# Patient Record
Sex: MALE | Race: WHITE | NOT HISPANIC OR LATINO | URBAN - METROPOLITAN AREA
[De-identification: names, ages, dates, MRNs, and addresses within clinical notes are randomized per-mention and may not be internally consistent; named-entity substitution may affect disease eponyms.]

---

## 2024-07-19 ENCOUNTER — LAB VISIT (OUTPATIENT)
Dept: LAB | Facility: HOSPITAL | Age: 54
End: 2024-07-19
Attending: INTERNAL MEDICINE
Payer: COMMERCIAL

## 2024-07-19 ENCOUNTER — OFFICE VISIT (OUTPATIENT)
Dept: FAMILY MEDICINE | Facility: CLINIC | Age: 54
End: 2024-07-19
Payer: COMMERCIAL

## 2024-07-19 VITALS
HEART RATE: 78 BPM | SYSTOLIC BLOOD PRESSURE: 120 MMHG | BODY MASS INDEX: 29.83 KG/M2 | DIASTOLIC BLOOD PRESSURE: 80 MMHG | OXYGEN SATURATION: 97 % | HEIGHT: 72 IN | TEMPERATURE: 98 F | WEIGHT: 220.25 LBS

## 2024-07-19 DIAGNOSIS — Z11.59 NEED FOR HEPATITIS C SCREENING TEST: ICD-10-CM

## 2024-07-19 DIAGNOSIS — Z12.12 ENCOUNTER FOR COLORECTAL CANCER SCREENING: ICD-10-CM

## 2024-07-19 DIAGNOSIS — Z12.11 ENCOUNTER FOR COLORECTAL CANCER SCREENING: ICD-10-CM

## 2024-07-19 DIAGNOSIS — Z11.4 ENCOUNTER FOR SCREENING FOR HIV: ICD-10-CM

## 2024-07-19 DIAGNOSIS — Z79.4 TYPE 2 DIABETES MELLITUS WITH LEFT EYE AFFECTED BY RETINOPATHY WITHOUT MACULAR EDEMA, WITH LONG-TERM CURRENT USE OF INSULIN, UNSPECIFIED RETINOPATHY SEVERITY: ICD-10-CM

## 2024-07-19 DIAGNOSIS — E11.319 TYPE 2 DIABETES MELLITUS WITH LEFT EYE AFFECTED BY RETINOPATHY WITHOUT MACULAR EDEMA, WITH LONG-TERM CURRENT USE OF INSULIN, UNSPECIFIED RETINOPATHY SEVERITY: ICD-10-CM

## 2024-07-19 DIAGNOSIS — F43.10 PTSD (POST-TRAUMATIC STRESS DISORDER): ICD-10-CM

## 2024-07-19 DIAGNOSIS — Z12.5 SCREENING PSA (PROSTATE SPECIFIC ANTIGEN): ICD-10-CM

## 2024-07-19 DIAGNOSIS — Z00.00 ANNUAL PHYSICAL EXAM: Primary | ICD-10-CM

## 2024-07-19 DIAGNOSIS — N53.19 EJACULATORY DISORDER: ICD-10-CM

## 2024-07-19 DIAGNOSIS — Z00.00 ANNUAL PHYSICAL EXAM: ICD-10-CM

## 2024-07-19 LAB
ALBUMIN SERPL BCP-MCNC: 3.5 G/DL (ref 3.5–5.2)
ALP SERPL-CCNC: 80 U/L (ref 55–135)
ALT SERPL W/O P-5'-P-CCNC: 16 U/L (ref 10–44)
ANION GAP SERPL CALC-SCNC: 7 MMOL/L (ref 8–16)
AST SERPL-CCNC: 15 U/L (ref 10–40)
BASOPHILS # BLD AUTO: 0.06 K/UL (ref 0–0.2)
BASOPHILS NFR BLD: 1.1 % (ref 0–1.9)
BILIRUB SERPL-MCNC: 0.9 MG/DL (ref 0.1–1)
BUN SERPL-MCNC: 12 MG/DL (ref 6–20)
CALCIUM SERPL-MCNC: 9.5 MG/DL (ref 8.7–10.5)
CHLORIDE SERPL-SCNC: 109 MMOL/L (ref 95–110)
CHOLEST SERPL-MCNC: 182 MG/DL (ref 120–199)
CHOLEST/HDLC SERPL: 5.7 {RATIO} (ref 2–5)
CO2 SERPL-SCNC: 28 MMOL/L (ref 23–29)
COMPLEXED PSA SERPL-MCNC: 0.9 NG/ML (ref 0–4)
CREAT SERPL-MCNC: 1 MG/DL (ref 0.5–1.4)
DIFFERENTIAL METHOD BLD: NORMAL
EOSINOPHIL # BLD AUTO: 0.2 K/UL (ref 0–0.5)
EOSINOPHIL NFR BLD: 3.3 % (ref 0–8)
ERYTHROCYTE [DISTWIDTH] IN BLOOD BY AUTOMATED COUNT: 13.1 % (ref 11.5–14.5)
EST. GFR  (NO RACE VARIABLE): >60 ML/MIN/1.73 M^2
ESTIMATED AVG GLUCOSE: 220 MG/DL (ref 68–131)
GLUCOSE SERPL-MCNC: 124 MG/DL (ref 70–110)
HBA1C MFR BLD: 9.3 % (ref 4–5.6)
HCT VFR BLD AUTO: 41.1 % (ref 40–54)
HCV AB SERPL QL IA: NORMAL
HDLC SERPL-MCNC: 32 MG/DL (ref 40–75)
HDLC SERPL: 17.6 % (ref 20–50)
HGB BLD-MCNC: 14.6 G/DL (ref 14–18)
HIV 1+2 AB+HIV1 P24 AG SERPL QL IA: NORMAL
IMM GRANULOCYTES # BLD AUTO: 0.01 K/UL (ref 0–0.04)
IMM GRANULOCYTES NFR BLD AUTO: 0.2 % (ref 0–0.5)
LDLC SERPL CALC-MCNC: 107.8 MG/DL (ref 63–159)
LYMPHOCYTES # BLD AUTO: 1.6 K/UL (ref 1–4.8)
LYMPHOCYTES NFR BLD: 29.7 % (ref 18–48)
MCH RBC QN AUTO: 30.5 PG (ref 27–31)
MCHC RBC AUTO-ENTMCNC: 35.5 G/DL (ref 32–36)
MCV RBC AUTO: 86 FL (ref 82–98)
MONOCYTES # BLD AUTO: 0.4 K/UL (ref 0.3–1)
MONOCYTES NFR BLD: 6.9 % (ref 4–15)
NEUTROPHILS # BLD AUTO: 3.2 K/UL (ref 1.8–7.7)
NEUTROPHILS NFR BLD: 58.8 % (ref 38–73)
NONHDLC SERPL-MCNC: 150 MG/DL
NRBC BLD-RTO: 0 /100 WBC
PLATELET # BLD AUTO: 178 K/UL (ref 150–450)
PMV BLD AUTO: 10.3 FL (ref 9.2–12.9)
POTASSIUM SERPL-SCNC: 4.2 MMOL/L (ref 3.5–5.1)
PROT SERPL-MCNC: 6.6 G/DL (ref 6–8.4)
RBC # BLD AUTO: 4.78 M/UL (ref 4.6–6.2)
SODIUM SERPL-SCNC: 144 MMOL/L (ref 136–145)
TRIGL SERPL-MCNC: 211 MG/DL (ref 30–150)
TSH SERPL DL<=0.005 MIU/L-ACNC: 1.36 UIU/ML (ref 0.4–4)
WBC # BLD AUTO: 5.39 K/UL (ref 3.9–12.7)

## 2024-07-19 PROCEDURE — 83036 HEMOGLOBIN GLYCOSYLATED A1C: CPT | Performed by: INTERNAL MEDICINE

## 2024-07-19 PROCEDURE — 80053 COMPREHEN METABOLIC PANEL: CPT | Performed by: INTERNAL MEDICINE

## 2024-07-19 PROCEDURE — 3074F SYST BP LT 130 MM HG: CPT | Mod: CPTII,S$GLB,, | Performed by: INTERNAL MEDICINE

## 2024-07-19 PROCEDURE — 84443 ASSAY THYROID STIM HORMONE: CPT | Performed by: INTERNAL MEDICINE

## 2024-07-19 PROCEDURE — 86803 HEPATITIS C AB TEST: CPT | Performed by: INTERNAL MEDICINE

## 2024-07-19 PROCEDURE — 80061 LIPID PANEL: CPT | Performed by: INTERNAL MEDICINE

## 2024-07-19 PROCEDURE — 85025 COMPLETE CBC W/AUTO DIFF WBC: CPT | Performed by: INTERNAL MEDICINE

## 2024-07-19 PROCEDURE — 99999 PR PBB SHADOW E&M-NEW PATIENT-LVL V: CPT | Mod: PBBFAC,,, | Performed by: INTERNAL MEDICINE

## 2024-07-19 PROCEDURE — 1159F MED LIST DOCD IN RCRD: CPT | Mod: CPTII,S$GLB,, | Performed by: INTERNAL MEDICINE

## 2024-07-19 PROCEDURE — 99386 PREV VISIT NEW AGE 40-64: CPT | Mod: S$GLB,,, | Performed by: INTERNAL MEDICINE

## 2024-07-19 PROCEDURE — 36415 COLL VENOUS BLD VENIPUNCTURE: CPT | Performed by: INTERNAL MEDICINE

## 2024-07-19 PROCEDURE — 84153 ASSAY OF PSA TOTAL: CPT | Performed by: INTERNAL MEDICINE

## 2024-07-19 PROCEDURE — 87389 HIV-1 AG W/HIV-1&-2 AB AG IA: CPT | Performed by: INTERNAL MEDICINE

## 2024-07-19 PROCEDURE — 3079F DIAST BP 80-89 MM HG: CPT | Mod: CPTII,S$GLB,, | Performed by: INTERNAL MEDICINE

## 2024-07-19 PROCEDURE — 3008F BODY MASS INDEX DOCD: CPT | Mod: CPTII,S$GLB,, | Performed by: INTERNAL MEDICINE

## 2024-07-19 PROCEDURE — 1160F RVW MEDS BY RX/DR IN RCRD: CPT | Mod: CPTII,S$GLB,, | Performed by: INTERNAL MEDICINE

## 2024-07-19 RX ORDER — INSULIN GLARGINE 100 [IU]/ML
40 INJECTION, SOLUTION SUBCUTANEOUS
COMMUNITY

## 2024-07-19 NOTE — PROGRESS NOTES
SUBJECTIVE     Chief Complaint   Patient presents with    Establish Care       HPI  Ivan Ngo is a 53 y.o. male with multiple medical diagnoses as listed in the medical history and problem list that presents for annual exam. Pt has been doing well since his last visit. He has a decreased appetite and eats well. He does exercise by walking. He sleeps for ~2-6 hours nightly. Pt does not take OTC supplements. He does have any current stressors and does not have an outlet. Pt is not UTD on age appropriate CA screening.    PAST MEDICAL HISTORY:  Past Medical History:   Diagnosis Date    PTSD (post-traumatic stress disorder)     Type 2 diabetes mellitus with unspecified diabetic retinopathy without macular edema        PAST SURGICAL HISTORY:  Past Surgical History:   Procedure Laterality Date    EYE SURGERY  2023    Detachedretina       SOCIAL HISTORY:  Social History     Socioeconomic History    Marital status:    Tobacco Use    Smoking status: Never    Smokeless tobacco: Never   Substance and Sexual Activity    Alcohol use: Never    Drug use: Never    Sexual activity: Yes     Partners: Female     Birth control/protection: I.U.D.     Social Determinants of Health     Financial Resource Strain: Low Risk  (7/18/2024)    Overall Financial Resource Strain (CARDIA)     Difficulty of Paying Living Expenses: Not hard at all   Food Insecurity: No Food Insecurity (7/18/2024)    Hunger Vital Sign     Worried About Running Out of Food in the Last Year: Never true     Ran Out of Food in the Last Year: Never true   Physical Activity: Insufficiently Active (7/18/2024)    Exercise Vital Sign     Days of Exercise per Week: 1 day     Minutes of Exercise per Session: 10 min   Stress: Stress Concern Present (7/18/2024)    [employer] [place] [employer] - Occupational Stress Questionnaire     Feeling of Stress : To some extent   Housing Stability: High Risk (7/18/2024)    Housing Stability Vital Sign     Unable to Pay  for Housing in the Last Year: Yes       FAMILY HISTORY:  Family History   Problem Relation Name Age of Onset    Breast cancer Mother Alexandrea 42    Early death Mother Alexandrea     Alcohol abuse Father Jose Luis     Cancer Father Jose Luis 55        Lungs, thyroid, brain    Diabetes Father Jose Luis     Early death Father Jose Luis        ALLERGIES AND MEDICATIONS: updated and reviewed.  Review of patient's allergies indicates:   Allergen Reactions    Allergen ext-venom-honey bee      Other Reaction(s): Pharyngeal swelling, Bronchospasm, Pharyngeal swelling, Bronchospasm    Wasp venom      Other Reaction(s): Pharyngeal swelling, Bronchospasm, Pharyngeal swelling, Bronchospasm     Current Outpatient Medications   Medication Sig Dispense Refill    insulin glargine U-100, Lantus, 100 unit/mL injection Inject 40 Units into the skin.       No current facility-administered medications for this visit.       ROS  Review of Systems   Constitutional:  Negative for chills and fever.   HENT:  Negative for hearing loss and sore throat.    Eyes:  Positive for visual disturbance.   Respiratory:  Negative for cough and shortness of breath.    Cardiovascular:  Negative for chest pain, palpitations and leg swelling.   Gastrointestinal:  Negative for abdominal pain, constipation, diarrhea, nausea and vomiting.   Genitourinary:  Positive for frequency. Negative for dysuria and urgency.   Musculoskeletal:  Negative for arthralgias, joint swelling and myalgias.   Skin:  Negative for rash and wound.   Neurological:  Negative for headaches.   Psychiatric/Behavioral:  Negative for agitation and confusion. The patient is nervous/anxious.          OBJECTIVE     Physical Exam  Vitals:    07/19/24 1011   BP: 120/80   Pulse: 78   Temp: 98.4 °F (36.9 °C)    Body mass index is 29.87 kg/m².  Weight: 99.9 kg (220 lb 3.8 oz)   Height: 6' (182.9 cm)     Physical Exam  Constitutional:       General: He is not in acute distress.     Appearance: He is well-developed.   HENT:       Head: Normocephalic and atraumatic.      Right Ear: Tympanic membrane, ear canal and external ear normal.      Left Ear: Tympanic membrane, ear canal and external ear normal.      Nose: Nose normal.   Eyes:      General: No scleral icterus.        Right eye: No discharge.         Left eye: No discharge.      Conjunctiva/sclera: Conjunctivae normal.   Neck:      Vascular: No JVD.      Trachea: No tracheal deviation.   Cardiovascular:      Rate and Rhythm: Normal rate and regular rhythm.      Heart sounds: Normal heart sounds. No murmur heard.     No friction rub. No gallop.   Pulmonary:      Effort: Pulmonary effort is normal. No respiratory distress.      Breath sounds: Normal breath sounds. No wheezing.   Abdominal:      General: Bowel sounds are normal. There is no distension.      Palpations: Abdomen is soft. There is no mass.      Tenderness: There is no abdominal tenderness. There is no guarding or rebound.   Musculoskeletal:         General: No tenderness or deformity. Normal range of motion.      Cervical back: Normal range of motion and neck supple.   Skin:     General: Skin is warm and dry.      Findings: No erythema or rash.   Neurological:      Mental Status: He is alert and oriented to person, place, and time.      Motor: No abnormal muscle tone.      Coordination: Coordination normal.   Psychiatric:         Behavior: Behavior normal.         Thought Content: Thought content normal.         Judgment: Judgment normal.           Health Maintenance         Date Due Completion Date    Hepatitis C Screening Never done ---    HIV Screening Never done ---    Colorectal Cancer Screening Never done ---    Shingles Vaccine (1 of 2) Never done ---    COVID-19 Vaccine (1 - 2023-24 season) Never done ---    Influenza Vaccine (1) 09/01/2024 10/23/2019    TETANUS VACCINE 01/01/2027 1/1/2017    Lipid Panel 07/19/2029 7/19/2024              ASSESSMENT     53 y.o. male with     1. Annual physical exam    2. Type 2  diabetes mellitus with left eye affected by retinopathy without macular edema, with long-term current use of insulin, unspecified retinopathy severity    3. PTSD (post-traumatic stress disorder)    4. Ejaculatory disorder    5. Encounter for screening for HIV    6. Need for hepatitis C screening test    7. Encounter for colorectal cancer screening    8. Screening PSA (prostate specific antigen)        PLAN:     1. Annual physical exam  - Counseled on age appropriate medical preventative services, including age appropriate cancer screenings, over all nutritional health, need for a consistent exercise regimen and an over all push towards maintaining a vigorous and active lifestyle.  Counseled on age appropriate vaccines and discussed upcoming health care needs based on age/gender.  Spent time with patient counseling on need for a good patient/doctor relationship moving forward.  Discussed use of common OTC medications and supplements.  Discussed common dietary aids and use of caffeine and the need for good sleep hygiene and stress management.  - CBC Auto Differential; Future  - Comprehensive Metabolic Panel; Future  - Hemoglobin A1C; Future  - TSH; Future  - Lipid Panel; Future    2. Type 2 diabetes mellitus with left eye affected by retinopathy without macular edema, with long-term current use of insulin, unspecified retinopathy severity  - Check lans  - Hemoglobin A1C; Future  - Microalbumin/Creatinine Ratio, Urine; Future    3. PTSD (post-traumatic stress disorder)  - Stable; no acute issues    4. Ejaculatory disorder  - Ambulatory referral/consult to Urology; Future    5. Encounter for screening for HIV  - HIV 1/2 Ag/Ab (4th Gen); Future    6. Need for hepatitis C screening test  - Hepatitis C Antibody; Future    7. Encounter for colorectal cancer screening  - Ambulatory referral/consult to Endo Procedure ; Future    8. Screening PSA (prostate specific antigen)  - PSA, Screening; Future        RTC in 3-6  months based on HgbA1C     Brunilda Osborn MD  07/19/2024 10:28 AM        No follow-ups on file.

## 2024-07-22 DIAGNOSIS — E11.319 TYPE 2 DIABETES MELLITUS WITH LEFT EYE AFFECTED BY RETINOPATHY WITHOUT MACULAR EDEMA, WITHOUT LONG-TERM CURRENT USE OF INSULIN, UNSPECIFIED RETINOPATHY SEVERITY: Primary | ICD-10-CM

## 2024-07-22 DIAGNOSIS — E78.5 HYPERLIPIDEMIA ASSOCIATED WITH TYPE 2 DIABETES MELLITUS: ICD-10-CM

## 2024-07-22 DIAGNOSIS — E11.69 HYPERLIPIDEMIA ASSOCIATED WITH TYPE 2 DIABETES MELLITUS: ICD-10-CM

## 2024-07-22 RX ORDER — ATORVASTATIN CALCIUM 40 MG/1
40 TABLET, FILM COATED ORAL DAILY
Qty: 90 TABLET | Refills: 3 | Status: SHIPPED | OUTPATIENT
Start: 2024-07-22 | End: 2025-07-22

## 2024-07-22 RX ORDER — TIRZEPATIDE 2.5 MG/.5ML
2.5 INJECTION, SOLUTION SUBCUTANEOUS
Qty: 4 PEN | Refills: 0 | Status: SHIPPED | OUTPATIENT
Start: 2024-07-22

## 2024-07-25 ENCOUNTER — PATIENT MESSAGE (OUTPATIENT)
Dept: FAMILY MEDICINE | Facility: CLINIC | Age: 54
End: 2024-07-25
Payer: COMMERCIAL

## 2024-07-25 RX ORDER — INSULIN GLARGINE 100 [IU]/ML
40 INJECTION, SOLUTION SUBCUTANEOUS DAILY
Qty: 30 ML | Refills: 0 | Status: SHIPPED | OUTPATIENT
Start: 2024-07-25

## 2024-07-25 NOTE — TELEPHONE ENCOUNTER
No care due was identified.  Health [facility] Embedded Care Due Messages. Reference number: [id number].   7/25/2024 8:17:45 AM CDT

## 2024-08-01 ENCOUNTER — OFFICE VISIT (OUTPATIENT)
Dept: UROLOGY | Facility: CLINIC | Age: 54
End: 2024-08-01
Payer: COMMERCIAL

## 2024-08-01 ENCOUNTER — LAB VISIT (OUTPATIENT)
Dept: LAB | Facility: HOSPITAL | Age: 54
End: 2024-08-01
Attending: STUDENT IN AN ORGANIZED HEALTH CARE EDUCATION/TRAINING PROGRAM
Payer: COMMERCIAL

## 2024-08-01 VITALS — HEIGHT: 72 IN | WEIGHT: 225.88 LBS | BODY MASS INDEX: 30.6 KG/M2

## 2024-08-01 DIAGNOSIS — E78.5 HYPERLIPIDEMIA ASSOCIATED WITH TYPE 2 DIABETES MELLITUS: ICD-10-CM

## 2024-08-01 DIAGNOSIS — N53.19 EJACULATORY DISORDER: Primary | ICD-10-CM

## 2024-08-01 DIAGNOSIS — N53.19 EJACULATORY DISORDER: ICD-10-CM

## 2024-08-01 DIAGNOSIS — N52.9 ERECTILE DYSFUNCTION, UNSPECIFIED ERECTILE DYSFUNCTION TYPE: ICD-10-CM

## 2024-08-01 DIAGNOSIS — E11.69 HYPERLIPIDEMIA ASSOCIATED WITH TYPE 2 DIABETES MELLITUS: ICD-10-CM

## 2024-08-01 LAB — COMPLEXED PSA SERPL-MCNC: 0.8 NG/ML (ref 0–4)

## 2024-08-01 PROCEDURE — 99999 PR PBB SHADOW E&M-EST. PATIENT-LVL III: CPT | Mod: PBBFAC,,, | Performed by: STUDENT IN AN ORGANIZED HEALTH CARE EDUCATION/TRAINING PROGRAM

## 2024-08-01 PROCEDURE — 84153 ASSAY OF PSA TOTAL: CPT | Performed by: STUDENT IN AN ORGANIZED HEALTH CARE EDUCATION/TRAINING PROGRAM

## 2024-08-01 PROCEDURE — 36415 COLL VENOUS BLD VENIPUNCTURE: CPT | Performed by: STUDENT IN AN ORGANIZED HEALTH CARE EDUCATION/TRAINING PROGRAM

## 2024-08-01 RX ORDER — TADALAFIL 10 MG/1
10 TABLET ORAL DAILY PRN
Qty: 60 TABLET | Refills: 3 | Status: SHIPPED | OUTPATIENT
Start: 2024-08-01 | End: 2025-08-01

## 2024-08-01 NOTE — PROGRESS NOTES
Patient ID: Ivan Ngo is a 53 y.o. male.    Chief Complaint: ED/ ejaculation concern    Referral: Brunilda Osborn MD  [address]  [place] [id number]     [facility]  53 y.o. who presents to the Urology clinic for evaluation of ED and dry ejaculation. Patient accompanied by wife who provides additional hx. Patient notes difficulty obtaining and maintaining erections. Patient w/ hx of uncontrolled DM, just started on mounjaro with hopes of improved control. Patient notes FH of breast cancer. No FH of prostate cancer. Patient w/ concerns about emptying his bladder, denies straining, or UTIs.       Medically Necessary ROS documented in HPI    Past Medical History  Active Ambulatory Problems     Diagnosis Date Noted    Type 2 diabetes mellitus with unspecified diabetic retinopathy without macular edema     PTSD (post-traumatic stress disorder)     Hyperlipidemia associated with type 2 diabetes mellitus 07/22/2024     Resolved Ambulatory Problems     Diagnosis Date Noted    No Resolved Ambulatory Problems     No Additional Past Medical History         Past Surgical History  Past Surgical History:   Procedure Laterality Date    EYE SURGERY  2023    Detachedretina       Social History       Medications    Current Outpatient Medications:     atorvastatin (LIPITOR) 40 MG tablet, Take 1 tablet (40 mg total) by mouth once daily., Disp: 90 tablet, Rfl: 3    insulin glargine U-100, Lantus, 100 unit/mL injection, Inject 40 Units into the skin once daily., Disp: 30 mL, Rfl: 0    tirzepatide (MOUNJARO) 2.5 mg/0.5 mL PnIj, Inject 2.5 mg into the skin every 7 days., Disp: 4 Pen, Rfl: 0    Allergies  Review of patient's allergies indicates:   Allergen Reactions    Allergen ext-venom-honey bee      Other Reaction(s): Pharyngeal swelling, Bronchospasm, Pharyngeal swelling, Bronchospasm    Wasp venom      Other Reaction(s): Pharyngeal swelling, Bronchospasm, Pharyngeal swelling, Bronchospasm       Patient's PMH, FH,  Social hx, Medications, allergies reviewed and updated as pertinent to today's visit    Objective:      Physical Exam  Constitutional:       General: He is not in acute distress.     Appearance: He is well-developed. He is not ill-appearing, toxic-appearing or diaphoretic.   HENT:      Head: Normocephalic and atraumatic.      Mouth/Throat:      Mouth: Mucous membranes are moist.   Eyes:      Conjunctiva/sclera: Conjunctivae normal.   Pulmonary:      Effort: Pulmonary effort is normal. No respiratory distress.   Abdominal:      General: There is no distension.      Palpations: Abdomen is soft. There is no mass.      Tenderness: There is no right CVA tenderness or left CVA tenderness.   Musculoskeletal:         General: No swelling or deformity.      Cervical back: Neck supple.   Skin:     Findings: No rash.   Neurological:      Mental Status: He is alert and oriented to person, place, and time.      Gait: Gait normal.   Psychiatric:         Mood and Affect: Mood normal.         Thought Content: Thought content normal.         Judgment: Judgment normal.         Assessment:       1. Ejaculatory disorder    2. Hyperlipidemia associated with type 2 diabetes mellitus    3. Erectile dysfunction, unspecified erectile dysfunction type        Plan:       Retrograde ejaculation  Discussed ejaculatory dysfunction is often seen in patients with uncontrolled DM  Provided patient is not trying to conceive no further management is recommended  Encouraged patient to follow up with PCP for strict glucose control    ED  Discussed and recommended tadalafil 10mg to start  Can consider increase to 20mg if ineffective    Voiding dysfunction   cc, acceptable  Discussed prolonged uncontrolled DM can negatively impact bladder function  Will continue to monitor  PSA due to FH of breast cancer, which increases risk of prostate cancer    RTC 3 months or sooner for sx check

## 2024-08-02 ENCOUNTER — PATIENT OUTREACH (OUTPATIENT)
Dept: ADMINISTRATIVE | Facility: HOSPITAL | Age: 54
End: 2024-08-02
Payer: COMMERCIAL

## 2024-08-02 NOTE — PROGRESS NOTES
Health Maintenance Due   Topic Date Due    Foot Exam  Never done    Eye Exam  Never done    Pneumococcal Vaccines (Age 0-64) (2 of 2 - PCV) 03/15/2017    Colorectal Cancer Screening  Never done    Shingles Vaccine (1 of 2) Never done    COVID-19 Vaccine (1 - 2023-24 season) Never done     Chart review done. HM updated. Immunizations reviewed & updated. Care Everywhere updated.  PORTAL MESSAGE SENT TO PATIENT ABOUT OVERDUE HM  PAT APPOINTMENT SCHEDULED

## 2024-08-20 DIAGNOSIS — E11.319 TYPE 2 DIABETES MELLITUS WITH LEFT EYE AFFECTED BY RETINOPATHY WITHOUT MACULAR EDEMA, WITHOUT LONG-TERM CURRENT USE OF INSULIN, UNSPECIFIED RETINOPATHY SEVERITY: ICD-10-CM

## 2024-08-20 RX ORDER — TIRZEPATIDE 2.5 MG/.5ML
INJECTION, SOLUTION SUBCUTANEOUS
OUTPATIENT
Start: 2024-08-20

## 2024-08-20 RX ORDER — TIRZEPATIDE 5 MG/.5ML
5 INJECTION, SOLUTION SUBCUTANEOUS
Qty: 4 PEN | Refills: 0 | Status: SHIPPED | OUTPATIENT
Start: 2024-08-20

## 2024-08-20 NOTE — TELEPHONE ENCOUNTER
Refill Routing Note   Medication(s) are not appropriate for processing by [facility] for the following reason(s):        New or recently adjusted medication    [facility] action(s):  Defer               Appointments  past 12m or future 3m with PCP    Date Provider   Last Visit   7/19/2024 Brunilda Osborn MD   Next Visit   9/30/2024 Brunilda Osborn MD   ED visits in past 90 days: 0        Note composed:4:10 PM 08/20/2024

## 2024-08-20 NOTE — TELEPHONE ENCOUNTER
No care due was identified.  Health [facility] Embedded Care Due Messages. Reference number: [id number].   8/20/2024 12:06:25 AM CDT

## 2024-09-10 RX ORDER — INSULIN GLARGINE 100 [IU]/ML
40 INJECTION, SOLUTION SUBCUTANEOUS DAILY
Qty: 30 ML | Refills: 0 | Status: SHIPPED | OUTPATIENT
Start: 2024-09-10

## 2024-09-30 ENCOUNTER — PATIENT MESSAGE (OUTPATIENT)
Dept: FAMILY MEDICINE | Facility: CLINIC | Age: 54
End: 2024-09-30

## 2024-09-30 ENCOUNTER — OFFICE VISIT (OUTPATIENT)
Dept: FAMILY MEDICINE | Facility: CLINIC | Age: 54
End: 2024-09-30
Payer: COMMERCIAL

## 2024-09-30 VITALS
WEIGHT: 235.69 LBS | HEIGHT: 72 IN | DIASTOLIC BLOOD PRESSURE: 84 MMHG | OXYGEN SATURATION: 97 % | BODY MASS INDEX: 31.92 KG/M2 | HEART RATE: 85 BPM | SYSTOLIC BLOOD PRESSURE: 132 MMHG | TEMPERATURE: 98 F

## 2024-09-30 DIAGNOSIS — E11.319 TYPE 2 DIABETES MELLITUS WITH LEFT EYE AFFECTED BY RETINOPATHY WITHOUT MACULAR EDEMA, WITH LONG-TERM CURRENT USE OF INSULIN, UNSPECIFIED RETINOPATHY SEVERITY: ICD-10-CM

## 2024-09-30 DIAGNOSIS — E11.319 TYPE 2 DIABETES MELLITUS WITH LEFT EYE AFFECTED BY RETINOPATHY WITHOUT MACULAR EDEMA, WITH LONG-TERM CURRENT USE OF INSULIN, UNSPECIFIED RETINOPATHY SEVERITY: Primary | ICD-10-CM

## 2024-09-30 DIAGNOSIS — Z79.4 TYPE 2 DIABETES MELLITUS WITH LEFT EYE AFFECTED BY RETINOPATHY WITHOUT MACULAR EDEMA, WITH LONG-TERM CURRENT USE OF INSULIN, UNSPECIFIED RETINOPATHY SEVERITY: ICD-10-CM

## 2024-09-30 DIAGNOSIS — Z79.4 TYPE 2 DIABETES MELLITUS WITH LEFT EYE AFFECTED BY RETINOPATHY WITHOUT MACULAR EDEMA, WITH LONG-TERM CURRENT USE OF INSULIN, UNSPECIFIED RETINOPATHY SEVERITY: Primary | ICD-10-CM

## 2024-09-30 PROCEDURE — 99999 PR PBB SHADOW E&M-EST. PATIENT-LVL III: CPT | Mod: PBBFAC,,, | Performed by: INTERNAL MEDICINE

## 2024-09-30 RX ORDER — BLOOD-GLUCOSE,RECEIVER,CONT
1 EACH MISCELLANEOUS DAILY
Qty: 1 EACH | Refills: 5 | Status: SHIPPED | OUTPATIENT
Start: 2024-09-30 | End: 2025-09-30

## 2024-09-30 RX ORDER — INSULIN GLARGINE 100 [IU]/ML
50 INJECTION, SOLUTION SUBCUTANEOUS DAILY
Qty: 30 ML | Refills: 0 | Status: SHIPPED | OUTPATIENT
Start: 2024-09-30

## 2024-09-30 RX ORDER — BLOOD-GLUCOSE TRANSMITTER
1 EACH MISCELLANEOUS DAILY
Qty: 1 EACH | Refills: 5 | Status: SHIPPED | OUTPATIENT
Start: 2024-09-30 | End: 2025-09-30

## 2024-09-30 RX ORDER — INSULIN GLARGINE 100 [IU]/ML
INJECTION, SOLUTION SUBCUTANEOUS
COMMUNITY
End: 2024-09-30

## 2024-09-30 NOTE — TELEPHONE ENCOUNTER
No care due was identified.  Health [facility] Embedded Care Due Messages. Reference number: [id number].   9/30/2024 4:54:32 PM CDT

## 2024-09-30 NOTE — PROGRESS NOTES
SUBJECTIVE     Chief Complaint   Patient presents with    Diabetes       HPI  Ivan Ngo is a 53 y.o. male with multiple medical diagnoses as listed in the medical history and problem list that presents for evaluation for DM2. Pt has been doing  well  since last visit. he is fully compliant with meds and denies any adverse side effects. Pt is  compliant  with an ADA diet and does not exercise. Pt does check his blood sugar levels at home with fasting readings ranging from  130  and random blood glucose levels ranging from  200s-300 . Pt reports 2-3 hypoglycemic episodes since last visit. Pt presents for med refills today and is without any other complaints.     PAST MEDICAL HISTORY:  Past Medical History:   Diagnosis Date    PTSD (post-traumatic stress disorder)     Type 2 diabetes mellitus with unspecified diabetic retinopathy without macular edema        PAST SURGICAL HISTORY:  Past Surgical History:   Procedure Laterality Date    EYE SURGERY Bilateral 2023    Detachedretina       SOCIAL HISTORY:  Social History     Socioeconomic History    Marital status:    Tobacco Use    Smoking status: Never    Smokeless tobacco: Never   Substance and Sexual Activity    Alcohol use: Never    Drug use: Never    Sexual activity: Yes     Partners: Female     Birth control/protection: I.U.D.     Social Drivers of Health     Financial Resource Strain: Low Risk  (7/18/2024)    Overall Financial Resource Strain (CARDIA)     Difficulty of Paying Living Expenses: Not hard at all   Food Insecurity: No Food Insecurity (7/18/2024)    Hunger Vital Sign     Worried About Running Out of Food in the Last Year: Never true     Ran Out of Food in the Last Year: Never true   Physical Activity: Insufficiently Active (7/18/2024)    Exercise Vital Sign     Days of Exercise per Week: 1 day     Minutes of Exercise per Session: 10 min   Stress: Stress Concern Present (7/18/2024)    [employer] [place] [employer] - Occupational Stress  Questionnaire     Feeling of Stress : To some extent   Housing Stability: High Risk (7/18/2024)    Housing Stability Vital Sign     Unable to Pay for Housing in the Last Year: Yes       FAMILY HISTORY:  Family History   Problem Relation Name Age of Onset    Breast cancer Mother Alexandrea 42    Early death Mother Alexandrea     Alcohol abuse Father Jose Luis     Cancer Father Jose Luis 55        Lungs, thyroid, brain    Diabetes Father Jose Luis     Early death Father Jose Luis        ALLERGIES AND MEDICATIONS: updated and reviewed.  Review of patient's allergies indicates:   Allergen Reactions    Allergen ext-venom-honey bee      Other Reaction(s): Pharyngeal swelling, Bronchospasm, Pharyngeal swelling, Bronchospasm    Wasp venom      Other Reaction(s): Pharyngeal swelling, Bronchospasm, Pharyngeal swelling, Bronchospasm     Current Outpatient Medications   Medication Sig Dispense Refill    atorvastatin (LIPITOR) 40 MG tablet Take 1 tablet (40 mg total) by mouth once daily. 90 tablet 3    tadalafiL (CIALIS) 10 MG tablet Take 1 tablet (10 mg total) by mouth daily as needed for Erectile Dysfunction. 60 tablet 3    blood-glucose meter,continuous (DEXCOM G6 ) Misc 1 Box by Misc.(Non-Drug; Combo Route) route Daily. 1 each 5    blood-glucose transmitter (DEXCOM G6 TRANSMITTER) Erna 1 Device by Misc.(Non-Drug; Combo Route) route Daily. 1 each 5    insulin glargine U-100, Lantus, 100 unit/mL injection Inject 50 Units into the skin once daily. 30 mL 0    tirzepatide (MOUNJARO) 5 mg/0.5 mL PnIj Inject 5 mg into the skin every 7 days. (Patient not taking: Reported on 9/30/2024) 4 Pen 0     No current facility-administered medications for this visit.       ROS  Review of Systems   Constitutional:  Negative for chills and fever.   HENT:  Negative for hearing loss and sore throat.    Eyes:  Positive for visual disturbance.   Respiratory:  Negative for cough and shortness of breath.    Cardiovascular:  Negative for chest pain, palpitations and leg  swelling.   Gastrointestinal:  Negative for abdominal pain, constipation, diarrhea, nausea and vomiting.   Genitourinary:  Negative for dysuria, frequency and urgency.   Musculoskeletal:  Negative for arthralgias, joint swelling and myalgias.   Skin:  Negative for rash and wound.   Neurological:  Negative for headaches.   Psychiatric/Behavioral:  Negative for agitation and confusion. The patient is not nervous/anxious.          OBJECTIVE     Physical Exam  Vitals:    09/30/24 0840   BP: 132/84   Pulse: 85   Temp: 97.6 °F (36.4 °C)    Body mass index is 31.96 kg/m².  Weight: 106.9 kg (235 lb 10.8 oz)   Height: 6' (182.9 cm)     Physical Exam  Constitutional:       General: He is not in acute distress.     Appearance: He is well-developed.   HENT:      Head: Normocephalic and atraumatic.      Right Ear: External ear normal.      Left Ear: External ear normal.      Nose: Nose normal.   Eyes:      General: No scleral icterus.        Right eye: No discharge.         Left eye: No discharge.      Conjunctiva/sclera: Conjunctivae normal.   Neck:      Vascular: No JVD.      Trachea: No tracheal deviation.   Cardiovascular:      Rate and Rhythm: Normal rate and regular rhythm.      Heart sounds: Normal heart sounds. No murmur heard.     No friction rub. No gallop.   Pulmonary:      Effort: Pulmonary effort is normal. No respiratory distress.      Breath sounds: Normal breath sounds. No wheezing.   Abdominal:      General: Bowel sounds are normal. There is no distension.      Palpations: Abdomen is soft. There is no mass.      Tenderness: There is no abdominal tenderness. There is no guarding or rebound.   Musculoskeletal:         General: No tenderness or deformity. Normal range of motion.      Cervical back: Normal range of motion and neck supple.   Skin:     General: Skin is warm and dry.      Findings: No erythema or rash.   Neurological:      Mental Status: He is alert and oriented to person, place, and time.      Motor:  No abnormal muscle tone.      Coordination: Coordination normal.   Psychiatric:         Behavior: Behavior normal.         Thought Content: Thought content normal.         Judgment: Judgment normal.           Health Maintenance         Date Due Completion Date    Foot Exam Never done ---    Eye Exam Never done ---    Pneumococcal Vaccines (Age 0-64) (2 of 2 - PCV) 03/15/2017 3/15/2016    Colorectal Cancer Screening Never done ---    Shingles Vaccine (1 of 2) Never done ---    Influenza Vaccine (1) 09/01/2024 10/23/2019    COVID-19 Vaccine (1 - 2024-25 season) Never done ---    Hemoglobin A1c 10/19/2024 7/19/2024    Diabetes Urine Screening 07/19/2025 7/19/2024    Lipid Panel 07/19/2025 7/19/2024    Low Dose Statin 09/30/2025 9/30/2024    TETANUS VACCINE 01/01/2027 1/1/2017    RSV Vaccine (Age 60+ and Pregnant patients) (1 - 1-dose 75+ series) 12/19/2047 ---              ASSESSMENT     53 y.o. male with     1. 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 Type 2 diabetes mellitus with left eye affected by retinopathy without macular edema, with long-term current use of insulin, unspecified retinopathy severity  - Check labs  - insulin glargine U-100, Lantus, 100 unit/mL injection; Inject 50 Units into the skin once daily.  Dispense: 30 mL; Refill: 0  - blood-glucose meter,continuous (DEXCOM G6 ) Misc; 1 Box by Misc.(Non-Drug; Combo Route) route Daily.  Dispense: 1 each; Refill: 5  - blood-glucose transmitter (DEXCOM G6 TRANSMITTER) Erna; 1 Device by Misc.(Non-Drug; Combo Route) route Daily.  Dispense: 1 each; Refill: 5  - CBC Auto Differential; Future  - Comprehensive Metabolic Panel; Future  - Hemoglobin A1C; Future            RTC in 3-6 months based on HgbA1C     Brunilda Osborn MD  09/30/2024 1:22 PM        No follow-ups on file.

## 2024-10-01 LAB
LEFT EYE DM RETINOPATHY: POSITIVE
RIGHT EYE DM RETINOPATHY: POSITIVE

## 2024-10-01 RX ORDER — BLOOD-GLUCOSE SENSOR
1 EACH MISCELLANEOUS DAILY
Qty: 1 EACH | Refills: 5 | Status: SHIPPED | OUTPATIENT
Start: 2024-10-01 | End: 2025-10-01

## 2024-10-01 NOTE — TELEPHONE ENCOUNTER
No care due was identified.  [facility] Embedded Care Due Messages. Reference number: [id number].   10/01/2024 2:41:22 PM CDT

## 2024-10-02 RX ORDER — INSULIN GLARGINE 100 [IU]/ML
50 INJECTION, SOLUTION SUBCUTANEOUS
Qty: 45 ML | Refills: 1 | OUTPATIENT
Start: 2024-10-02

## 2024-10-02 NOTE — TELEPHONE ENCOUNTER
Refill Decision Note  Quick DC. Request already responded to by other means (e.g. phone or fax)    Ivna Ngo  is requesting a refill authorization.  Brief Assessment and Rationale for Refill:  Quick Discontinue     Medication Therapy Plan:       Medication Reconciliation Completed: No   Comments:           Note composed:7:28 AM 10/02/2024

## 2024-10-05 DIAGNOSIS — Z79.4 TYPE 2 DIABETES MELLITUS WITH LEFT EYE AFFECTED BY RETINOPATHY WITHOUT MACULAR EDEMA, WITH LONG-TERM CURRENT USE OF INSULIN, UNSPECIFIED RETINOPATHY SEVERITY: Primary | ICD-10-CM

## 2024-10-05 DIAGNOSIS — E11.319 TYPE 2 DIABETES MELLITUS WITH LEFT EYE AFFECTED BY RETINOPATHY WITHOUT MACULAR EDEMA, WITH LONG-TERM CURRENT USE OF INSULIN, UNSPECIFIED RETINOPATHY SEVERITY: Primary | ICD-10-CM

## 2024-10-05 DIAGNOSIS — E11.319 TYPE 2 DIABETES MELLITUS WITH LEFT EYE AFFECTED BY RETINOPATHY WITHOUT MACULAR EDEMA, WITH LONG-TERM CURRENT USE OF INSULIN, UNSPECIFIED RETINOPATHY SEVERITY: ICD-10-CM

## 2024-10-05 DIAGNOSIS — Z79.4 TYPE 2 DIABETES MELLITUS WITH LEFT EYE AFFECTED BY RETINOPATHY WITHOUT MACULAR EDEMA, WITH LONG-TERM CURRENT USE OF INSULIN, UNSPECIFIED RETINOPATHY SEVERITY: ICD-10-CM

## 2024-10-05 RX ORDER — BLOOD-GLUCOSE SENSOR
1 EACH MISCELLANEOUS DAILY
Qty: 1 EACH | Refills: 5 | Status: CANCELLED | OUTPATIENT
Start: 2024-10-05 | End: 2025-10-05

## 2024-10-05 NOTE — TELEPHONE ENCOUNTER
No care due was identified.  [facility] Embedded Care Due Messages. Reference number: [id number].   10/05/2024 3:08:11 PM CDT

## 2024-10-05 NOTE — TELEPHONE ENCOUNTER
No care due was identified.  [facility] Embedded Care Due Messages. Reference number: [id number].   10/05/2024 3:09:48 PM CDT

## 2024-10-07 DIAGNOSIS — E11.319 TYPE 2 DIABETES MELLITUS WITH LEFT EYE AFFECTED BY RETINOPATHY WITHOUT MACULAR EDEMA, WITHOUT LONG-TERM CURRENT USE OF INSULIN, UNSPECIFIED RETINOPATHY SEVERITY: Primary | ICD-10-CM

## 2024-10-07 RX ORDER — BLOOD-GLUCOSE SENSOR
EACH MISCELLANEOUS
Qty: 1 EACH | Refills: 5 | Status: SHIPPED | OUTPATIENT
Start: 2024-10-07

## 2024-10-07 RX ORDER — INSULIN GLARGINE 100 [IU]/ML
50 INJECTION, SOLUTION SUBCUTANEOUS DAILY
Qty: 50 ML | Refills: 0 | Status: SHIPPED | OUTPATIENT
Start: 2024-10-07

## 2024-10-07 NOTE — TELEPHONE ENCOUNTER
No care due was identified.  [facility] Embedded Care Due Messages. Reference number: [id number].   10/07/2024 3:07:17 PM CDT

## 2024-10-07 NOTE — TELEPHONE ENCOUNTER
Refill Routing Note   Medication(s) are not appropriate for processing by [facility] for the following reason(s):        Clarification of medication (Rx) details  New or recently adjusted medication    [facility] action(s):  Defer        Medication Therapy Plan: Patient comment: These need to be for the pens instead of the vials or I need another script for the needles please.      Appointments  past 12m or future 3m with PCP    Date Provider   Last Visit   9/30/2024 Brunilda Osborn MD   Next Visit   Visit date not found Brunilda Osborn MD   ED visits in past 90 days: 0        Note composed:2:20 PM 10/07/2024

## 2024-10-08 NOTE — TELEPHONE ENCOUNTER
Refill Routing Note   Medication(s) are not appropriate for processing by [facility] for the following reason(s):        Clarification of medication (Rx) details  New or recently adjusted medication    [facility] action(s):  Defer        Medication Therapy Plan: New start (10/7/24);      Appointments  past 12m or future 3m with PCP    Date Provider   Last Visit   9/30/2024 Brunilda Osborn MD   Next Visit   Visit date not found Brunilda Osborn MD   ED visits in past 90 days: 0        Note composed:9:05 AM 10/08/2024

## 2024-10-09 RX ORDER — BLOOD-GLUCOSE SENSOR
EACH MISCELLANEOUS
Qty: 12 EACH | Refills: 3 | Status: SHIPPED | OUTPATIENT
Start: 2024-10-09

## 2024-10-15 ENCOUNTER — PATIENT OUTREACH (OUTPATIENT)
Dept: ADMINISTRATIVE | Facility: HOSPITAL | Age: 54
End: 2024-10-15
Payer: COMMERCIAL

## 2024-10-15 NOTE — PROGRESS NOTES
Health Maintenance Due   Topic Date Due    Foot Exam  Never done    Pneumococcal Vaccines (Age 0-64) (2 of 2 - PCV) 03/15/2017    Colorectal Cancer Screening  Never done    Shingles Vaccine (1 of 2) Never done    Influenza Vaccine (1) 09/01/2024    COVID-19 Vaccine (1 - 2024-25 season) Never done    Hemoglobin A1c  10/19/2024   Chart review done. HM updated. Immunizations reviewed & updated. Care Everywhere updated.  DIABETIC EYE EXAM UPDATE

## 2024-10-25 ENCOUNTER — TELEPHONE (OUTPATIENT)
Dept: ENDOSCOPY | Facility: HOSPITAL | Age: 54
End: 2024-10-25

## 2024-10-25 ENCOUNTER — CLINICAL SUPPORT (OUTPATIENT)
Dept: ENDOSCOPY | Facility: HOSPITAL | Age: 54
End: 2024-10-25
Attending: INTERNAL MEDICINE
Payer: COMMERCIAL

## 2024-10-25 DIAGNOSIS — Z12.12 ENCOUNTER FOR COLORECTAL CANCER SCREENING: ICD-10-CM

## 2024-10-25 DIAGNOSIS — Z12.11 ENCOUNTER FOR COLORECTAL CANCER SCREENING: ICD-10-CM

## 2024-11-04 DIAGNOSIS — E11.319 TYPE 2 DIABETES MELLITUS WITH LEFT EYE AFFECTED BY RETINOPATHY WITHOUT MACULAR EDEMA, WITHOUT LONG-TERM CURRENT USE OF INSULIN, UNSPECIFIED RETINOPATHY SEVERITY: ICD-10-CM

## 2024-11-05 NOTE — TELEPHONE ENCOUNTER
Refill Routing Note   Medication(s) are not appropriate for processing by [facility] for the following reason(s):        Clarification of medication (Rx) details  New or recently adjusted medication    [facility] action(s):  Defer   Requires labs : Yes      Medication Therapy Plan: RX directions updated to proper days supply per Dexcom G6 sensor; DEFER for provider review      Appointments  past 12m or future 3m with PCP    Date Provider   Last Visit   9/30/2024 Brunilda Osborn MD   Next Visit   Visit date not found Brunilda Osborn MD   ED visits in past 90 days: 0        Note composed:1:37 PM 11/05/2024

## 2024-11-05 NOTE — TELEPHONE ENCOUNTER
Care Due:                  Date            Visit Type   Department     Provider  --------------------------------------------------------------------------------                                [place]      [facility]/OF  MEDICINE/INTERN  Last Visit: 09-      FICE VISIT   AL MED         Brunilda Osborn  Next Visit: None Scheduled  None         None Found                                                            Last  Test          Frequency    Reason                     Performed    Due Date  --------------------------------------------------------------------------------    HBA1C.......  6 months...  insulin, tirzepatide.....  07- 01-    Health [facility] Embedded Care Due Messages. Reference number: [id number].   11/04/2024 8:48:22 PM CST

## 2024-11-06 RX ORDER — BLOOD-GLUCOSE SENSOR
EACH MISCELLANEOUS
Qty: 2 EACH | Refills: 0 | Status: SHIPPED | OUTPATIENT
Start: 2024-11-06

## 2024-12-02 DIAGNOSIS — E11.319 TYPE 2 DIABETES MELLITUS WITH LEFT EYE AFFECTED BY RETINOPATHY WITHOUT MACULAR EDEMA, WITH LONG-TERM CURRENT USE OF INSULIN, UNSPECIFIED RETINOPATHY SEVERITY: ICD-10-CM

## 2024-12-02 DIAGNOSIS — Z79.4 TYPE 2 DIABETES MELLITUS WITH LEFT EYE AFFECTED BY RETINOPATHY WITHOUT MACULAR EDEMA, WITH LONG-TERM CURRENT USE OF INSULIN, UNSPECIFIED RETINOPATHY SEVERITY: ICD-10-CM

## 2024-12-02 NOTE — TELEPHONE ENCOUNTER
No care due was identified.  Health [facility] Embedded Care Due Messages. Reference number: [id number].   12/02/2024 1:58:02 PM CST

## 2024-12-03 RX ORDER — INSULIN GLARGINE 100 [IU]/ML
50 INJECTION, SOLUTION SUBCUTANEOUS
Qty: 30 ML | Refills: 0 | Status: SHIPPED | OUTPATIENT
Start: 2024-12-03

## 2024-12-03 NOTE — TELEPHONE ENCOUNTER
Refill Routing Note   Medication(s) are not appropriate for processing by [facility] for the following reason(s):        New or recently adjusted medication    [facility] action(s):  Defer               Appointments  past 12m or future 3m with PCP    Date Provider   Last Visit   9/30/2024 Brunilda Osborn MD   Next Visit   Visit date not found Brunilda Osborn MD   ED visits in past 90 days: 0        Note composed:9:56 AM 12/03/2024

## 2024-12-09 RX ORDER — INSULIN GLARGINE 100 [IU]/ML
40 INJECTION, SOLUTION SUBCUTANEOUS
Qty: 30 EACH | OUTPATIENT
Start: 2024-12-09

## 2024-12-09 NOTE — TELEPHONE ENCOUNTER
Refill Decision Note   Ivan Ngo  is requesting a refill authorization.    Brief Assessment and Rationale for Refill:   Quick Discontinue       Medication Therapy Plan:   dose increased to 50 units on 9/10/24      Comments:     Note composed:3:56 PM 12/09/2024

## 2024-12-09 NOTE — TELEPHONE ENCOUNTER
No care due was identified.  Health [facility] Embedded Care Due Messages. Reference number: [id number].   12/08/2024 8:52:46 PM CST

## 2024-12-24 ENCOUNTER — PATIENT MESSAGE (OUTPATIENT)
Dept: FAMILY MEDICINE | Facility: CLINIC | Age: 54
End: 2024-12-24
Payer: COMMERCIAL

## 2024-12-24 RX ORDER — INSULIN GLARGINE 100 [IU]/ML
40 INJECTION, SOLUTION SUBCUTANEOUS
Qty: 30 EACH | OUTPATIENT
Start: 2024-12-24

## 2024-12-24 NOTE — TELEPHONE ENCOUNTER
Refill Decision Note  Nanci DC. Inappropriate Request     Ivan Ngo  is requesting a refill authorization.  Brief Assessment and Rationale for Refill:  Quick Discontinue     Medication Therapy Plan:  prescription was discontinued on 9/30/2024 by Brunilda Osborn MD    Medication Reconciliation Completed: No   Comments:           Note composed:7:23 AM 12/24/2024

## 2024-12-24 NOTE — TELEPHONE ENCOUNTER
No care due was identified.  Health [facility] Embedded Care Due Messages. Reference number: [id number].   12/24/2024 10:54:01 AM CST

## 2024-12-24 NOTE — TELEPHONE ENCOUNTER
No care due was identified.  Health [facility] Embedded Care Due Messages. Reference number: [id number].   12/23/2024 8:47:17 PM CST

## 2024-12-26 RX ORDER — PEN NEEDLE, DIABETIC 30 GX3/16"
1 NEEDLE, DISPOSABLE MISCELLANEOUS DAILY
Qty: 100 EACH | Refills: 0 | Status: SHIPPED | OUTPATIENT
Start: 2024-12-26

## 2025-01-17 ENCOUNTER — PATIENT MESSAGE (OUTPATIENT)
Dept: ADMINISTRATIVE | Facility: HOSPITAL | Age: 55
End: 2025-01-17
Payer: COMMERCIAL

## 2025-02-06 DIAGNOSIS — Z79.4 TYPE 2 DIABETES MELLITUS WITH LEFT EYE AFFECTED BY RETINOPATHY WITHOUT MACULAR EDEMA, WITH LONG-TERM CURRENT USE OF INSULIN, UNSPECIFIED RETINOPATHY SEVERITY: ICD-10-CM

## 2025-02-06 DIAGNOSIS — E11.319 TYPE 2 DIABETES MELLITUS WITH LEFT EYE AFFECTED BY RETINOPATHY WITHOUT MACULAR EDEMA, WITH LONG-TERM CURRENT USE OF INSULIN, UNSPECIFIED RETINOPATHY SEVERITY: ICD-10-CM

## 2025-02-06 RX ORDER — INSULIN GLARGINE 100 [IU]/ML
50 INJECTION, SOLUTION SUBCUTANEOUS
Qty: 10 ML | Refills: 0 | Status: SHIPPED | OUTPATIENT
Start: 2025-02-06

## 2025-02-06 NOTE — TELEPHONE ENCOUNTER
Refill Routing Note   Medication(s) are not appropriate for processing by [facility] for the following reason(s):        Required labs outdated    [facility] action(s):  Defer     Requires labs : Yes             Appointments  past 12m or future 3m with PCP    Date Provider   Last Visit   9/30/2024 Brunilda Osborn MD   Next Visit   Visit date not found Brunilda Osborn MD   ED visits in past 90 days: 0        Note composed:12:39 PM 02/06/2025

## 2025-02-06 NOTE — TELEPHONE ENCOUNTER
Care Due:                  Date            Visit Type   Department     Provider  --------------------------------------------------------------------------------                                [place]      [facility]/OF  MEDICINE/INTERN  Last Visit: 09-      FICE VISIT   AL MED         Brunilda Osborn  Next Visit: None Scheduled  None         None Found                                                            Last  Test          Frequency    Reason                     Performed    Due Date  --------------------------------------------------------------------------------    HBA1C.......  6 months...  insulin, tirzepatide.....  07- 01-    Health [facility] Embedded Care Due Messages. Reference number: [id number].   2/06/2025 10:31:33 AM CST

## 2025-04-17 DIAGNOSIS — E11.319 TYPE 2 DIABETES MELLITUS WITH LEFT EYE AFFECTED BY RETINOPATHY WITHOUT MACULAR EDEMA, WITH LONG-TERM CURRENT USE OF INSULIN, UNSPECIFIED RETINOPATHY SEVERITY: ICD-10-CM

## 2025-04-17 DIAGNOSIS — Z79.4 TYPE 2 DIABETES MELLITUS WITH LEFT EYE AFFECTED BY RETINOPATHY WITHOUT MACULAR EDEMA, WITH LONG-TERM CURRENT USE OF INSULIN, UNSPECIFIED RETINOPATHY SEVERITY: ICD-10-CM

## 2025-04-17 RX ORDER — INSULIN GLARGINE 100 [IU]/ML
50 INJECTION, SOLUTION SUBCUTANEOUS
Qty: 15 ML | Refills: 0 | Status: SHIPPED | OUTPATIENT
Start: 2025-04-17

## 2025-04-17 NOTE — TELEPHONE ENCOUNTER
Care Due:                  Date            Visit Type   Department     Provider  --------------------------------------------------------------------------------                                LAWRENCE      [facility]/OF  MEDICINE /  Last Visit: 09-      FICE VISIT   INTERNAL MED   Brunilda Osborn  Next Visit: None Scheduled  None         None Found                                                            Last  Test          Frequency    Reason                     Performed    Due Date  --------------------------------------------------------------------------------    CMP.........  12 months..  atorvastatin, insulin....  07- 07-    HBA1C.......  6 months...  insulin, tirzepatide.....  07- 01-    Lipid Panel.  12 months..  atorvastatin.............  07- 07-    Health [facility] Embedded Care Due Messages. Reference number: [id number].   4/17/2025 11:14:49 AM CDT

## 2025-04-17 NOTE — TELEPHONE ENCOUNTER
Refill Routing Note   Medication(s) are not appropriate for processing by [facility] for the following reason(s):        Required labs outdated    [facility] action(s):  Defer     Requires labs : Yes             Appointments  past 12m or future 3m with PCP    Date Provider   Last Visit   9/30/2024 Brunilda Osborn MD   Next Visit   Visit date not found Brunilda Osborn MD   ED visits in past 90 days: 0        Note composed:4:24 PM 04/17/2025

## 2025-04-19 ENCOUNTER — HOSPITAL ENCOUNTER (EMERGENCY)
Facility: HOSPITAL | Age: 55
Discharge: ANOTHER HOSPITAL | End: 2025-04-19
Attending: STUDENT IN AN ORGANIZED HEALTH CARE EDUCATION/TRAINING PROGRAM
Payer: COMMERCIAL

## 2025-04-19 VITALS
RESPIRATION RATE: 18 BRPM | HEART RATE: 65 BPM | OXYGEN SATURATION: 99 % | TEMPERATURE: 98 F | HEIGHT: 72 IN | DIASTOLIC BLOOD PRESSURE: 83 MMHG | BODY MASS INDEX: 31.15 KG/M2 | SYSTOLIC BLOOD PRESSURE: 141 MMHG | WEIGHT: 230 LBS

## 2025-04-19 DIAGNOSIS — H43.11 VITREOUS HEMORRHAGE OF RIGHT EYE: Primary | ICD-10-CM

## 2025-04-19 DIAGNOSIS — H54.61 VISION LOSS OF RIGHT EYE: ICD-10-CM

## 2025-04-19 LAB
ABSOLUTE EOSINOPHIL (OHS): 0.33 K/UL
ABSOLUTE MONOCYTE (OHS): 0.52 K/UL (ref 0.3–1)
ABSOLUTE NEUTROPHIL COUNT (OHS): 3.79 K/UL (ref 1.8–7.7)
ALBUMIN SERPL BCP-MCNC: 3.7 G/DL (ref 3.5–5.2)
ALP SERPL-CCNC: 87 UNIT/L (ref 40–150)
ALT SERPL W/O P-5'-P-CCNC: 26 UNIT/L (ref 10–44)
ANION GAP (OHS): 13 MMOL/L (ref 8–16)
AST SERPL-CCNC: 19 UNIT/L (ref 11–45)
BASOPHILS # BLD AUTO: 0.04 K/UL
BASOPHILS NFR BLD AUTO: 0.6 %
BILIRUB SERPL-MCNC: 0.5 MG/DL (ref 0.1–1)
BUN SERPL-MCNC: 15 MG/DL (ref 6–20)
CALCIUM SERPL-MCNC: 8.9 MG/DL (ref 8.7–10.5)
CHLORIDE SERPL-SCNC: 109 MMOL/L (ref 95–110)
CO2 SERPL-SCNC: 20 MMOL/L (ref 23–29)
CREAT SERPL-MCNC: 0.9 MG/DL (ref 0.5–1.4)
ERYTHROCYTE [DISTWIDTH] IN BLOOD BY AUTOMATED COUNT: 12.7 % (ref 11.5–14.5)
GFR SERPLBLD CREATININE-BSD FMLA CKD-EPI: >60 ML/MIN/1.73/M2
GLUCOSE SERPL-MCNC: 143 MG/DL (ref 70–110)
HCT VFR BLD AUTO: 42.4 % (ref 40–54)
HGB BLD-MCNC: 14.8 GM/DL (ref 14–18)
IMM GRANULOCYTES # BLD AUTO: 0.02 K/UL (ref 0–0.04)
IMM GRANULOCYTES NFR BLD AUTO: 0.3 % (ref 0–0.5)
INR PPP: 0.9 (ref 0.8–1.2)
LYMPHOCYTES # BLD AUTO: 1.57 K/UL (ref 1–4.8)
MCH RBC QN AUTO: 30 PG (ref 27–31)
MCHC RBC AUTO-ENTMCNC: 34.9 G/DL (ref 32–36)
MCV RBC AUTO: 86 FL (ref 82–98)
NUCLEATED RBC (/100WBC) (OHS): 0 /100 WBC
PLATELET # BLD AUTO: 177 K/UL (ref 150–450)
PMV BLD AUTO: 11 FL (ref 9.2–12.9)
POTASSIUM SERPL-SCNC: 3.8 MMOL/L (ref 3.5–5.1)
PROT SERPL-MCNC: 7.3 GM/DL (ref 6–8.4)
PROTHROMBIN TIME: 10.7 SECONDS (ref 9–12.5)
RBC # BLD AUTO: 4.93 M/UL (ref 4.6–6.2)
RELATIVE EOSINOPHIL (OHS): 5.3 %
RELATIVE LYMPHOCYTE (OHS): 25 % (ref 18–48)
RELATIVE MONOCYTE (OHS): 8.3 % (ref 4–15)
RELATIVE NEUTROPHIL (OHS): 60.5 % (ref 38–73)
SODIUM SERPL-SCNC: 142 MMOL/L (ref 136–145)
WBC # BLD AUTO: 6.27 K/UL (ref 3.9–12.7)

## 2025-04-19 PROCEDURE — 25000003 PHARM REV CODE 250: Performed by: STUDENT IN AN ORGANIZED HEALTH CARE EDUCATION/TRAINING PROGRAM

## 2025-04-19 PROCEDURE — 99285 EMERGENCY DEPT VISIT HI MDM: CPT | Mod: 25

## 2025-04-19 PROCEDURE — 85025 COMPLETE CBC W/AUTO DIFF WBC: CPT | Performed by: STUDENT IN AN ORGANIZED HEALTH CARE EDUCATION/TRAINING PROGRAM

## 2025-04-19 PROCEDURE — 85610 PROTHROMBIN TIME: CPT | Performed by: STUDENT IN AN ORGANIZED HEALTH CARE EDUCATION/TRAINING PROGRAM

## 2025-04-19 PROCEDURE — 80053 COMPREHEN METABOLIC PANEL: CPT | Performed by: STUDENT IN AN ORGANIZED HEALTH CARE EDUCATION/TRAINING PROGRAM

## 2025-04-19 RX ORDER — ACETAMINOPHEN 325 MG/1
650 TABLET ORAL
Status: COMPLETED | OUTPATIENT
Start: 2025-04-19 | End: 2025-04-19

## 2025-04-19 RX ORDER — TETRACAINE HYDROCHLORIDE 5 MG/ML
2 SOLUTION OPHTHALMIC
Status: COMPLETED | OUTPATIENT
Start: 2025-04-19 | End: 2025-04-19

## 2025-04-19 RX ADMIN — FLUORESCEIN SODIUM 1 EACH: 1 STRIP OPHTHALMIC at 07:04

## 2025-04-19 RX ADMIN — ACETAMINOPHEN 650 MG: 325 TABLET ORAL at 07:04

## 2025-04-19 RX ADMIN — TETRACAINE HYDROCHLORIDE 2 DROP: 5 SOLUTION OPHTHALMIC at 07:04

## 2025-04-19 NOTE — ED NOTES
"C/C: 54 y.o male arrived to the ED via EMS transfer from [facility] for Ophthalmology consult. Patient with hx of visual deficits reports acute visual changes to right eye last night, 4/18/25, at approximately 2200 when he got home from his son's graduation. He accidentally walked into a pole earlier that morning, however, reported pain over left periorbital region. He normally has "20/80" vision to right eye, however, reports only seeing "figures, shadows, and light." Endorses baseline HA. Denies nausea, vomiting, changes in sensation.    APPEARANCE: awake, alert, and appears to be in NAD. Placed on cont pulse ox and auto BP cuff. Bed in lowest and locked position w/ side rails up x2. Call light w/n pt reach and instructed on how to use.   SKIN: warm, dry and intact. No breakdown or bruising.  MUSCULOSKELETAL: Patient moving all extremities spontaneously, no obvious swelling or deformities noted. Ambulates independently.  RESPIRATORY: Airway open and patent. Denies shortness of breath. Respirations unlabored.   CARDIAC: Regular HR. Denies CP, 2+ distal pulses; no peripheral edema  ABDOMEN: S/ND/NT, Denies N/V/D  : Pt voids spontaneously, denies difficulty  NEUROLOGIC: AAO x 4; speaking and following commands appropriately. Equal strength in all extremities; denies numbness/tingling. Denies dizziness. +HA, visual changes.  "

## 2025-04-19 NOTE — Clinical Note
"Ivan Ngo (Carl) was seen and treated in our emergency department on 4/19/2025.  He may return to work on 04/22/2025.       If you have any questions or concerns, please don't hesitate to call.      Martha Beard RN    "

## 2025-04-19 NOTE — CONSULTS
Consultation Report  Ophthalmology Service    Date: 04/19/2025    Chief complaint/Reason for Consult: Painless vision loss right eye. Transfer.     History of Present Illness: Ivan Ngo is a 54 y.o. 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 Presenting with vision loss right eye.    States yesterday was moving a heavy ice box when noticed loss of vision in right eye. Describes as a blurry sheet in vision. Denies floaters, curtain in vision, recent trauma. Endorsing flashes of lights but is chronic and no acute changes. No complaints left eye.    POcularHx: See below    Current eye gtts: none      PMHx:  has a past medical history of PTSD (post-traumatic stress disorder) and Type 2 diabetes mellitus with unspecified diabetic retinopathy without macular edema.     PSurgHx:  has a past surgical history that includes Eye surgery (Bilateral, 2023).     Home Medications:   Prior to Admission medications    Medication Sig Start Date End Date Taking? Authorizing Provider   atorvastatin (LIPITOR) 40 MG tablet Take 1 tablet (40 mg total) by mouth once daily. 7/22/24 7/22/25  Brunilda Osborn MD   blood-glucose meter,continuous (DEXCOM G6 ) Misc 1 Box by Misc.(Non-Drug; Combo Route) route Daily. 9/30/24 9/30/25  Brunilda Osborn MD   blood-glucose sensor (DEXCOM G6 SENSOR) Erna Route: 1 Device by Misc.(Non-Drug; Combo Route) route once daily. 10/7/24   Brunilda Osborn MD   blood-glucose sensor (DEXCOM G6 SENSOR) Erna Please change every 10 days 11/6/24   Brunilda Osborn MD   blood-glucose sensor (DEXCOM G7 SENSOR) Erna 1 Device by Misc.(Non-Drug; Combo Route) route once daily. 10/1/24 10/1/25  Brunilda Osborn MD   blood-glucose transmitter (DEXCOM G6 TRANSMITTER) Erna 1 Device by Misc.(Non-Drug; Combo Route) route Daily. 9/30/24 9/30/25  Brunilda Osborn  "MD THOM   insulin glargine U-100, Lantus, (LANTUS U-100 INSULIN) 100 unit/mL injection INJECT 50 UNITS INTO THE SKIN ONCE DAILY. 4/17/25   Brunilda Osborn MD   insulin syringe-needle U-100 0.5 mL 31 gauge x 5/16" Syrg 1 each by Misc.(Non-Drug; Combo Route) route Daily. 12/30/24   Brunilda Osborn MD   pen needle, diabetic 31 gauge x 3/16" Ndle 1 each by Misc.(Non-Drug; Combo Route) route Daily. 12/26/24   Janet Crain MD   tadalafiL (CIALIS) 10 MG tablet Take 1 tablet (10 mg total) by mouth daily as needed for Erectile Dysfunction. 8/1/24 8/1/25  Eleanor Delgado MD   tirzepatide (MOUNJARO) 5 mg/0.5 mL PnIj Inject 5 mg into the skin every 7 days.  Patient not taking: Reported on 9/30/2024 8/20/24   Brunilda Osborn MD        Medications this encounter:     Allergies: is allergic to allergen ext-venom-honey bee and wasp venom.     Social:  reports that he has never smoked. He has never used smokeless tobacco. He reports that he does not drink alcohol and does not use drugs.     Family Hx: No family history of glaucoma, macular degeneration, or blindness. family history includes Alcohol abuse in his father; Breast cancer (age of onset: 42) in his mother; Cancer (age of onset: 55) in his father; Diabetes in his father; Early death in his father and mother.     ROS: Per HPI    Ocular examination/Dilated fundus examination:  Base Eye Exam       Visual Acuity (Snellen - Linear)         Right Left    Dist sc HM@face CF@2 ft              Tonometry (Tonopen, 12:04 PM)         Right Left    Pressure 14 17              Pupils         Dark Light Shape React APD    Right 3 2 Round Brisk None    Left 3 2 Irregular Sluggish Trace              Visual Fields         Right Left    Restrictions Total superior temporal, inferior temporal, superior nasal, inferior nasal deficiencies Total superior temporal, inferior temporal, superior nasal, inferior nasal deficiencies              Extraocular Movement         Right Left     Full, " Ortho Full, Ortho              Dilation       Both eyes: 1% Mydriacyl, 2.5% Phenylephrine @ 1:51 PM                  Slit Lamp and Fundus Exam       External Exam         Right Left    External Normal Normal              Slit Lamp Exam         Right Left    Lids/Lashes Normal Normal    Conjunctiva/Sclera White and quiet White and quiet    Cornea Clear Clear    Anterior Chamber deep, tr flare, 3+ cell (RBCs) Deep and quiet    Iris pharm dilated, no NVI pharm dilated, no NVI, superior and inferior synechiae    Lens 1+ NSC 2-3+ NSC    Anterior Vitreous vitreous hemorrhage silicone oil              Fundus Exam         Right Left    Disc hazy view (bscan) Normal    Macula hazy view (bscan) difficult view 2/2 silicone oil, dense cataract, and iris synechiae but appears flat and stable    Vessels hazy view (bscan)     Periphery hazy view (bscan)                   Bscan OD (4/19/2025):    - vitreous flat with no masses, dynamic vitreous debris    Assessment/Plan:   New vitreous hemorrhage, right eye  2.  Tractional retinal detachment 2/2 proliferative diabetic retinopathy s/p repair, right eye  3. Monocular, right eye  - Pt follows with Dr. Lee  @ [facility]  - Retinal detachment right eye s/p PPV/SO 8/2024 and s/p PPV/SO removal 11/2024  - Hx of VH right eye 12/2024 s/p avastin injection  - Base exam (4/19/2025): [facility] HM@face, iop wnl, 3+ AC cell (RBCs)  - no NVI OU  - Reports was lifting icebox @ onset of symptoms 4/18/2025, no recent trauma  - B scan consistent with VH, retinal flat with no masses/tears/detachment    4.  Tractional retinal detachment 2/2 proliferative diabetic retinopathy s/p repair, left eye  - Retinal detachment left eye s/p PPV/gas 12/2023, subsequently PPV/silicone oil insertion 8/2024  - Poor vision at baseline, no acute complaints or changes to vision, exam appears stable    Recommendations:  - No acute interventions  - Patient to follow-up with own retina specialists (  [facility]) scheduled 4/21/2025  - Recommend laying at 30 degree angle. No heavy lifting. Avoid additional NSAIDs or aspirin unless medically indicated by other providers   - Discussed with patient that vision may get worse before gets better as blood diffuses through vitreous.    - Retinal detachment return precautions given    If there are further questions, please page the on call ophthalmology resident.    Pierre Diane MD  [id number], Ophthalmology Resident  04/19/2025  1:33 PM

## 2025-04-19 NOTE — ED NOTES
Assumed care of the patient. Report received from ILIANA Simental. Pt on continuous cardiac monitoring, continuous pulse oximetry, and automatic BP cuff cycling Q15min. Pt in hospital gown, side rails up X2, bed low and locked, and call light is placed within reach. One family/visitors at bedside at this time. Pt denies any complaints or needs.

## 2025-04-19 NOTE — ED PROVIDER NOTES
Encounter Date: 4/19/2025       History     Chief Complaint   Patient presents with    Loss of Vision     Pt got home last night and lifted something heavy and started to have cloudy vision to rt eye and then could only see bright light, no change since 10 pm. Hx of eye surgery to his retina    Transfer     Transfer fro [facility] [place] for optho consult     Patient is a 54-year-old with history of retinal detachment s/p vitrectomy presenting due to painless vision loss in his right eye.  Patient states that he was lifting heavy objects out of his car yesterday evening when he developed haziness of his visions that progressively got worse.  He states that he normally can not see out of his left eye.  He can see some light and some shapes but that is it.  No changes in vision in his left eye however his right eye is normally 20/80 but has subsequently gotten worse over the past day.  States that he follows with retinal specialist.  Has had history of vitreous hemorrhages.  Had head trauma on Thursday of last week.  Denies any symptoms following the head trauma.  Denies any pain in his eye, fevers, headaches, chest pain, shortness of breath, abdominal pain.        Review of patient's allergies indicates:   Allergen Reactions    Allergen ext-venom-honey bee      Other Reaction(s): Pharyngeal swelling, Bronchospasm, Pharyngeal swelling, Bronchospasm    Wasp venom      Other Reaction(s): Pharyngeal swelling, Bronchospasm, Pharyngeal swelling, Bronchospasm     Past Medical History:   Diagnosis Date    PTSD (post-traumatic stress disorder)     Type 2 diabetes mellitus with unspecified diabetic retinopathy without macular edema      Past Surgical History:   Procedure Laterality Date    EYE SURGERY Bilateral 2023    Detachedretina     Family History   Problem Relation Name Age of Onset    Breast cancer Mother Alexandrea 42    Early death Mother Alexandrea     Alcohol abuse Father Jose Luis     Cancer Father Jose Luis 55        Lungs, thyroid, brain     Diabetes Father Jose Luis     Early death Father Jose Luis      Social History[1]  Review of Systems  Per HPI  Physical Exam     Initial Vitals [04/19/25 0632]   BP Pulse Resp Temp SpO2   129/83 80 16 98.4 °F (36.9 °C) 98 %      MAP       --         Physical Exam    Constitutional: He appears well-developed and well-nourished. He is not diaphoretic. No distress.   HENT:   Head: Normocephalic.   Eyes: Conjunctivae are normal. Right eye exhibits no discharge. Left eye exhibits no discharge. No scleral icterus.   Right eye constricts with lights.  Left eye does not.  Patient has no pain with movement of his eyes.  No injection of the sclera or conjunctiva.  Patient has decreased peripheral field detection.  He is able to recognize fingers it directly in front of his eye but incorrectly numbers the amount of fingers.   Cardiovascular:  Normal rate, regular rhythm and normal heart sounds.           No murmur heard.  Pulmonary/Chest: Breath sounds normal. No stridor. No respiratory distress. He has no wheezes. He has no rhonchi. He has no rales.     Neurological: He is alert. GCS score is 15. GCS eye subscore is 4. GCS verbal subscore is 5. GCS motor subscore is 6.   Skin: Skin is warm and dry.   Psychiatric: He has a normal mood and affect.         ED Course   Procedures  Labs Reviewed   COMPREHENSIVE METABOLIC PANEL - Abnormal       Result Value    Sodium 142      Potassium 3.8      Chloride 109      CO2 20 (*)     Glucose 143 (*)     BUN 15      Creatinine 0.9      Calcium 8.9      Protein Total 7.3      Albumin 3.7      Bilirubin Total 0.5      ALP 87      AST 19      ALT 26      Anion Gap 13      eGFR >60      Narrative:     Specimen slightly hemolyzed.   PROTIME-INR - Normal    PT 10.7      INR 0.9     CBC WITH DIFFERENTIAL - Normal    WBC 6.27      RBC 4.93      HGB 14.8      HCT 42.4      MCV 86      MCH 30.0      MCHC 34.9      RDW 12.7      Platelet Count 177      MPV 11.0      Nucleated RBC 0      Neut % 60.5      Lymph  % 25.0      Mono % 8.3      Eos % 5.3      Basophil % 0.6      Imm Grans % 0.3      Neut # 3.79      Lymph # 1.57      Mono # 0.52      Eos # 0.33      Baso # 0.04      Imm Grans # 0.02     CBC W/ AUTO DIFFERENTIAL    Narrative:     The following orders were created for panel order CBC auto differential.  Procedure                               Abnormality         Status                     ---------                               -----------         ------                     CBC with Differential[[id number]]       Normal              Final result                 Please view results for these tests on the individual orders.   HEPATITIS C ANTIBODY   HEP C VIRUS HOLD SPECIMEN   HIV 1 / 2 ANTIBODY          Imaging Results              CT Head Without Contrast (Final result)  Result time 04/19/25 08:02:19      Final result by Dale Boggs MD (04/19/25 08:02:19)                   Impression:      1. No acute intracranial findings.  2. No acute orbital pathology by CT.      Electronically signed by: Dale Boggs  Date:    04/19/2025  Time:    08:02               Narrative:    EXAMINATION:  CT HEAD WITHOUT CONTRAST; CT ORBITS SELLA POST FOSSA WITHOUT CONT    CLINICAL HISTORY:  Vision loss, monocular;acute r eye vision loss;; Vision loss, monocular;    TECHNIQUE:  Low dose axial images were obtained through the head and orbits.  Coronal and sagittal reformations were also performed. Contrast was not administered.    COMPARISON:  None.    FINDINGS:  Head:    Blood: No acute intracranial hemorrhage.    Parenchyma: No definite loss of gray-white differentiation to suggest acute or subacute transcortical infarct.    Ventricles/Extra-axial spaces: No abnormal extra-axial fluid collection. Basal cisterns are patent.    Vessels: Grossly unremarkable by unenhanced technique.    Orbits: Postoperative changes of left lobe would be in keeping with silicone injection.  Grossly unremarkable appearance of the right  orbit.    Scalp: Unremarkable.    Skull: There are no depressed skull fractures or destructive bone lesions.    Sinuses and mastoids: Hypoplastic left maxillary sinus.  Relatively minimal paranasal sinus mucosal thickening.    Other findings: Mild chronic appearing irregularity of the left nasal arch.    Orbits:    Bony orbits: No acute fracture or destructive lesion is identified.    Globes: As above.    Extraocular muscles: The extraocular muscles are symmetric and non-enlarged.    Optic nerve/sheath complexes: There is no CT abnormality of the optic nerve/sheath complexes.    Periorbita: There is no focal periorbital abnormality.    Retrobulbar fat: The retrobulbar fat is normal bilaterally.    Paranasal sinuses: As above.    Mastoids and middle ear cavities: The imaged mastoid and middle ear cavities are essentially clear.    Intracranial contents: As above.                                       CT Orbits Sella Post Fossa Without Cont (Final result)  Result time 04/19/25 08:02:19      Final result by Dale Boggs MD (04/19/25 08:02:19)                   Impression:      1. No acute intracranial findings.  2. No acute orbital pathology by CT.      Electronically signed by: Dale Boggs  Date:    04/19/2025  Time:    08:02               Narrative:    EXAMINATION:  CT HEAD WITHOUT CONTRAST; CT ORBITS SELLA POST FOSSA WITHOUT CONT    CLINICAL HISTORY:  Vision loss, monocular;acute r eye vision loss;; Vision loss, monocular;    TECHNIQUE:  Low dose axial images were obtained through the head and orbits.  Coronal and sagittal reformations were also performed. Contrast was not administered.    COMPARISON:  None.    FINDINGS:  Head:    Blood: No acute intracranial hemorrhage.    Parenchyma: No definite loss of gray-white differentiation to suggest acute or subacute transcortical infarct.    Ventricles/Extra-axial spaces: No abnormal extra-axial fluid collection. Basal cisterns are patent.    Vessels: Grossly  unremarkable by unenhanced technique.    Orbits: Postoperative changes of left lobe would be in keeping with silicone injection.  Grossly unremarkable appearance of the right orbit.    Scalp: Unremarkable.    Skull: There are no depressed skull fractures or destructive bone lesions.    Sinuses and mastoids: Hypoplastic left maxillary sinus.  Relatively minimal paranasal sinus mucosal thickening.    Other findings: Mild chronic appearing irregularity of the left nasal arch.    Orbits:    Bony orbits: No acute fracture or destructive lesion is identified.    Globes: As above.    Extraocular muscles: The extraocular muscles are symmetric and non-enlarged.    Optic nerve/sheath complexes: There is no CT abnormality of the optic nerve/sheath complexes.    Periorbita: There is no focal periorbital abnormality.    Retrobulbar fat: The retrobulbar fat is normal bilaterally.    Paranasal sinuses: As above.    Mastoids and middle ear cavities: The imaged mastoid and middle ear cavities are essentially clear.    Intracranial contents: As above.                                       Medications   fluorescein ophthalmic strip 1 each (1 each Right Eye Given 4/19/25 0700)   acetaminophen tablet 650 mg (650 mg Oral Given 4/19/25 0735)   TETRAcaine HCl (PF) 0.5 % Drop 2 drop (2 drops Right Eye Given 4/19/25 0715)     Medical Decision Making  Patient is a 54-year-old afebrile, HDS, in no acute distress male presenting due to transfer for ophthalmology consultation.      DDX:  CRA0, retinal detachment, vitreous hemorrhage, neuritis    Ophthalmology consulted on arrival to emergency room.  Assess patient on the Ophthalmology floor.  Reports that patient has a vitreous hemorrhage.  There was no acute intervention at this time.  They are recommended that he follows up with his retinal specialist in 2 days.  Additionally they are recommended that he elevates while sleeping at night.  Instructed to not take it NSAIDs.  Patient lives with  his wife is able to care for him given his acutely worsening vision loss.  Work note provided.  Discharge instructions provided with Ophthalmology recommendations.  Patient discharged home.  Questions answered.  Return precautions given.    Amount and/or Complexity of Data Reviewed  Labs: ordered.  Radiology: ordered.    Risk  OTC drugs.  Prescription drug management.               ED Course as of 04/19/25 1457   Sat Apr 19, 2025   0696 Discussed case with Dr. Dye on the phone, likely is a vitreous hemorrhage given history and clinical presentation which would not require emergent procedure however unable to rule out other emergent pathology requiring emergent procedure without optho evaluation, recommended transfer to [facility] [place] for optho eval [LF]      ED Course User Index  [LF] Nadine Pinto MD                           Clinical Impression:  Final diagnoses:  [H54.61] Vision loss of right eye  [H43.11] Vitreous hemorrhage of right eye (Primary)          ED Disposition Condition    Transfer to Another Facility Stable                  [1]   Social History  Tobacco Use    Smoking status: Never    Smokeless tobacco: Never   Substance Use Topics    Alcohol use: Never    Drug use: Never        Devan Paulino MD  Resident  04/19/25 1102

## 2025-04-19 NOTE — DISCHARGE INSTRUCTIONS
Ophtho Recommendations:  - Follow-up with own retina specialists (Dr. Lee) scheduled 4/21/2025  - Recommend laying at 30 degree angle. No heavy lifting. Avoid additional NSAIDs or aspirin unless medically indicated by other providers   - Vision may get worse before gets better  - Please return to ED if complete loss of vision, or you develop worsening pain in that eye  
[FreeTextEntry2] : [place] Macroglobulinemia, iron deficiency anemia

## 2025-04-19 NOTE — ED PROVIDER NOTES
"Encounter Date: 4/19/2025    SCRIBE #1 NOTE: I, Luli Boyd, am scribing for, and in the presence of,  Nadine Pinto MD. I have scribed the following portions of the note - Other sections scribed: HPI, ROS, PE.       History     Chief Complaint   Patient presents with    Loss of Vision     Pt got home last night and lifted something heavy and started to have cloudy vision to rt eye and then could only see bright light, no change since 10 pm. Hx of eye surgery to his retina     53 yo M w/PTSD, IDDM with diabetic retinopathy w/hx of bilateral retinal detachment s/p bl vitrectomy, presenting with acute painless vision change in R eye, w/baseline L eye blindness.    Presents to ED  accompanied by his wife c/o acute loss of vision in the right eye that began suddenly at 10:00 pm last night. Pt reports that he picked up a cooler to bring inside and notes that when he sat the cooler down a "haze" came over his entire right eye in both central and peripheral vision, slowly worsening over the next hour. No dark curtain visual loss, flashers or floaters preceding vision change. He states that he is able to see light and shapes currently in R eye only. Patient at baseline is only able to see shapes/light in L eye, R eye at baseline with 20/80 vision reported by patient.      No face/leg/arm numbness or weakness, no dysarthria.    Patient reports mild headache this morning, not sudden in onset, which is common for him, no pain to eye or behind eye, no neck pain. No recent trauma to head, or eyes, no recent falls, last week hit his L head on a pole with no LOC.    Follows with MD Lee at [facility] , has planned appointment on Monday with Rosa but was told to come to the ER when he called the office regarding vision loss occurring last night.        The history is provided by the patient.     Review of patient's allergies indicates:   Allergen Reactions    Allergen ext-venom-honey bee      Other " Reaction(s): Pharyngeal swelling, Bronchospasm, Pharyngeal swelling, Bronchospasm    Wasp venom      Other Reaction(s): Pharyngeal swelling, Bronchospasm, Pharyngeal swelling, Bronchospasm     Past Medical History:   Diagnosis Date    PTSD (post-traumatic stress disorder)     Type 2 diabetes mellitus with unspecified diabetic retinopathy without macular edema      Past Surgical History:   Procedure Laterality Date    EYE SURGERY Bilateral 2023    Detachedretina     Family History   Problem Relation Name Age of Onset    Breast cancer Mother Alexandrea 42    Early death Mother Alexandrea     Alcohol abuse Father Jose Luis     Cancer Father Jose Luis 55        Lungs, thyroid, brain    Diabetes Father Jose Luis     Early death Father Jose Luis      Social History[1]  Review of Systems    Physical Exam     Initial Vitals [04/19/25 0632]   BP Pulse Resp Temp SpO2   129/83 80 16 98.4 °F (36.9 °C) 98 %      MAP       --         Physical Exam    Constitutional: He appears well-developed and well-nourished.   HENT:   Head: Normocephalic and atraumatic.   Eyes: EOM are normal.   Full EOM, no ophthalmoplegia, pupillary reflex intact on R, decreased on L (at baseline)  IOP on R 15-16, conjunctiva quiet bl, no fluorescein uptake to cornea on R, bl eyes w/shape/light perception only, unable to count fingers bilaterally   Cardiovascular:  Normal rate and regular rhythm.           Pulmonary/Chest: Effort normal.   Abdominal: He exhibits no distension.   Musculoskeletal:         General: No tenderness or edema.     Neurological: He is alert and oriented to person, place, and time.   No nystagmus, shoulder shrug intact, face symmetric, tongue midline, finger-nose-finger nl, sensation intact and equal in face/arms/legs, upper extremity and lower extremity flexor and extensor strength equal and intact     Skin: Skin is warm and dry.   Psychiatric: He has a normal mood and affect.         ED Course   Procedures  Labs Reviewed   COMPREHENSIVE METABOLIC PANEL - Abnormal        Result Value    Sodium 142      Potassium 3.8      Chloride 109      CO2 20 (*)     Glucose 143 (*)     BUN 15      Creatinine 0.9      Calcium 8.9      Protein Total 7.3      Albumin 3.7      Bilirubin Total 0.5      ALP 87      AST 19      ALT 26      Anion Gap 13      eGFR >60      Narrative:     Specimen slightly hemolyzed.   PROTIME-INR - Normal    PT 10.7      INR 0.9     CBC WITH DIFFERENTIAL - Normal    WBC 6.27      RBC 4.93      HGB 14.8      HCT 42.4      MCV 86      MCH 30.0      MCHC 34.9      RDW 12.7      Platelet Count 177      MPV 11.0      Nucleated RBC 0      Neut % 60.5      Lymph % 25.0      Mono % 8.3      Eos % 5.3      Basophil % 0.6      Imm Grans % 0.3      Neut # 3.79      Lymph # 1.57      Mono # 0.52      Eos # 0.33      Baso # 0.04      Imm Grans # 0.02     CBC W/ AUTO DIFFERENTIAL    Narrative:     The following orders were created for panel order CBC auto differential.  Procedure                               Abnormality         Status                     ---------                               -----------         ------                     CBC with Differential[[id number]]       Normal              Final result                 Please view results for these tests on the individual orders.          Imaging Results              CT Head Without Contrast (Final result)  Result time 04/19/25 08:02:19      Final result by Dale Boggs MD (04/19/25 08:02:19)                   Impression:      1. No acute intracranial findings.  2. No acute orbital pathology by CT.      Electronically signed by: Dale Boggs  Date:    04/19/2025  Time:    08:02               Narrative:    EXAMINATION:  CT HEAD WITHOUT CONTRAST; CT ORBITS SELLA POST FOSSA WITHOUT CONT    CLINICAL HISTORY:  Vision loss, monocular;acute r eye vision loss;; Vision loss, monocular;    TECHNIQUE:  Low dose axial images were obtained through the head and orbits.  Coronal and sagittal reformations were also performed.  Contrast was not administered.    COMPARISON:  None.    FINDINGS:  Head:    Blood: No acute intracranial hemorrhage.    Parenchyma: No definite loss of gray-white differentiation to suggest acute or subacute transcortical infarct.    Ventricles/Extra-axial spaces: No abnormal extra-axial fluid collection. Basal cisterns are patent.    Vessels: Grossly unremarkable by unenhanced technique.    Orbits: Postoperative changes of left lobe would be in keeping with silicone injection.  Grossly unremarkable appearance of the right orbit.    Scalp: Unremarkable.    Skull: There are no depressed skull fractures or destructive bone lesions.    Sinuses and mastoids: Hypoplastic left maxillary sinus.  Relatively minimal paranasal sinus mucosal thickening.    Other findings: Mild chronic appearing irregularity of the left nasal arch.    Orbits:    Bony orbits: No acute fracture or destructive lesion is identified.    Globes: As above.    Extraocular muscles: The extraocular muscles are symmetric and non-enlarged.    Optic nerve/sheath complexes: There is no CT abnormality of the optic nerve/sheath complexes.    Periorbita: There is no focal periorbital abnormality.    Retrobulbar fat: The retrobulbar fat is normal bilaterally.    Paranasal sinuses: As above.    Mastoids and middle ear cavities: The imaged mastoid and middle ear cavities are essentially clear.    Intracranial contents: As above.                                       CT Orbits Sella Post Fossa Without Cont (Final result)  Result time 04/19/25 08:02:19      Final result by Dale Boggs MD (04/19/25 08:02:19)                   Impression:      1. No acute intracranial findings.  2. No acute orbital pathology by CT.      Electronically signed by: Dale Boggs  Date:    04/19/2025  Time:    08:02               Narrative:    EXAMINATION:  CT HEAD WITHOUT CONTRAST; CT ORBITS SELLA POST FOSSA WITHOUT CONT    CLINICAL HISTORY:  Vision loss, monocular;acute r eye  vision loss;; Vision loss, monocular;    TECHNIQUE:  Low dose axial images were obtained through the head and orbits.  Coronal and sagittal reformations were also performed. Contrast was not administered.    COMPARISON:  None.    FINDINGS:  Head:    Blood: No acute intracranial hemorrhage.    Parenchyma: No definite loss of gray-white differentiation to suggest acute or subacute transcortical infarct.    Ventricles/Extra-axial spaces: No abnormal extra-axial fluid collection. Basal cisterns are patent.    Vessels: Grossly unremarkable by unenhanced technique.    Orbits: Postoperative changes of left lobe would be in keeping with silicone injection.  Grossly unremarkable appearance of the right orbit.    Scalp: Unremarkable.    Skull: There are no depressed skull fractures or destructive bone lesions.    Sinuses and mastoids: Hypoplastic left maxillary sinus.  Relatively minimal paranasal sinus mucosal thickening.    Other findings: Mild chronic appearing irregularity of the left nasal arch.    Orbits:    Bony orbits: No acute fracture or destructive lesion is identified.    Globes: As above.    Extraocular muscles: The extraocular muscles are symmetric and non-enlarged.    Optic nerve/sheath complexes: There is no CT abnormality of the optic nerve/sheath complexes.    Periorbita: There is no focal periorbital abnormality.    Retrobulbar fat: The retrobulbar fat is normal bilaterally.    Paranasal sinuses: As above.    Mastoids and middle ear cavities: The imaged mastoid and middle ear cavities are essentially clear.    Intracranial contents: As above.                                       Medications   fluorescein ophthalmic strip 1 each (1 each Right Eye Given 4/19/25 0700)   acetaminophen tablet 650 mg (650 mg Oral Given 4/19/25 0735)   TETRAcaine HCl (PF) 0.5 % Drop 2 drop (2 drops Right Eye Given 4/19/25 0715)     Medical Decision Making  53 yo M w/PTSD, IDDM with diabetic retinopathy w/hx of bilateral retinal  detachment s/p bl vitrectomy, presenting with acute painless vision change in R eye, w/baseline L eye blindness.    Differential diagnosis includes but is not limited to: vitreous hemorrhage, retinal detachment, CRAO/CRVO, lens dislocation    Complicated ophthalmologic history with multiple eye surgeries bilaterally, recurrent retinal detachments bilaterally, laser treatments, with baseline near blindness (L eye light only), R eye 20/80 baseline, with progressive severe haziness in R eye last night at 10PM. Given history, is likely recurrent retinal detachment vs vitreous hemorrhage, IOP is normal in R eye (15-16), with quiet conjunctiva, pupillary reflex intact in R.    Less concern for CRAO given no dark curtain visual loss, is blurriness only, is full visual field blurriness which may be less consistent with CRVO. No concern for CVA given painless visual blurriness in R eye only, no focal neuro deficits on exam.    Will likely need emergent ophthalmoplegic evaluation at [facility] [place], [place] to evaluate for need for emergent procedure vs appropriate for follow up on Monday with his own retinal specialist as planned, obtained basic lab work up and CT imaging in [facility]. Labs obtained today including CBC, CMP, with mild hyperglycemia as expected for diabetic patient, did take insulin this AM, no other significant abnormalities on labs.     Discussed case with Dr. Padilla (optho) on the phone, likely is a vitreous hemorrhage given history and clinical presentation which would not require emergent procedure however unable to rule out other emergent pathology requiring emergent procedure without optho evaluation, with patient essentially with minimal monocular vision in R eye at baseline, recommended transfer to [facility] [place], [place] for optho eval.        Amount and/or Complexity of Data Reviewed  Labs: ordered.  Radiology: ordered.    Risk  OTC drugs.  Prescription drug management.            Scribe Attestation:   Scribe #1: I performed the above scribed  service and the documentation accurately describes the services I performed. I attest to the accuracy of the note.        ED Course as of 04/19/25 0922   Sat Apr 19, 2025   0875 Discussed case with Dr. Dye on the phone, likely is a vitreous hemorrhage given history and clinical presentation which would not require emergent procedure however unable to rule out other emergent pathology requiring emergent procedure without optho evaluation, [place] [facility] [place] [LF]      ED Course User Index  [LF] Nadine Pinto MD                     I, Nadine Pinto, personally performed the services described in this documentation. All medical record entries made by the scribe were at my direction and in my presence. I have reviewed the chart and agree that the record reflects my personal performance and is accurate and complete.       Clinical Impression:  Final diagnoses:  [H54.61] Vision loss of right eye (Primary)          ED Disposition Condition    Transfer to Another Facility Stable                    [1]   Social History  Tobacco Use    Smoking status: Never    Smokeless tobacco: Never   Substance Use Topics    Alcohol use: Never    Drug use: Never        Nadine Pinto MD  04/19/25 0922

## 2025-04-30 ENCOUNTER — OFFICE VISIT (OUTPATIENT)
Dept: FAMILY MEDICINE | Facility: CLINIC | Age: 55
End: 2025-04-30
Payer: COMMERCIAL

## 2025-04-30 VITALS
SYSTOLIC BLOOD PRESSURE: 120 MMHG | OXYGEN SATURATION: 97 % | HEART RATE: 75 BPM | HEIGHT: 72 IN | DIASTOLIC BLOOD PRESSURE: 80 MMHG | WEIGHT: 244.06 LBS | BODY MASS INDEX: 33.06 KG/M2 | TEMPERATURE: 98 F

## 2025-04-30 DIAGNOSIS — E11.319 TYPE 2 DIABETES MELLITUS WITH LEFT EYE AFFECTED BY RETINOPATHY WITHOUT MACULAR EDEMA, WITH LONG-TERM CURRENT USE OF INSULIN, UNSPECIFIED RETINOPATHY SEVERITY: ICD-10-CM

## 2025-04-30 DIAGNOSIS — Z79.4 TYPE 2 DIABETES MELLITUS WITH LEFT EYE AFFECTED BY RETINOPATHY WITHOUT MACULAR EDEMA, WITH LONG-TERM CURRENT USE OF INSULIN, UNSPECIFIED RETINOPATHY SEVERITY: ICD-10-CM

## 2025-04-30 DIAGNOSIS — E11.65 UNCONTROLLED TYPE 2 DIABETES MELLITUS WITH HYPERGLYCEMIA: Primary | ICD-10-CM

## 2025-04-30 DIAGNOSIS — Z12.11 ENCOUNTER FOR COLORECTAL CANCER SCREENING: ICD-10-CM

## 2025-04-30 DIAGNOSIS — Z12.12 ENCOUNTER FOR COLORECTAL CANCER SCREENING: ICD-10-CM

## 2025-04-30 PROCEDURE — 99999 PR PBB SHADOW E&M-EST. PATIENT-LVL V: CPT | Mod: PBBFAC,,, | Performed by: NURSE PRACTITIONER

## 2025-04-30 PROCEDURE — 1159F MED LIST DOCD IN RCRD: CPT | Mod: CPTII,S$GLB,, | Performed by: NURSE PRACTITIONER

## 2025-04-30 PROCEDURE — 3074F SYST BP LT 130 MM HG: CPT | Mod: CPTII,S$GLB,, | Performed by: NURSE PRACTITIONER

## 2025-04-30 PROCEDURE — 99214 OFFICE O/P EST MOD 30 MIN: CPT | Mod: S$GLB,,, | Performed by: NURSE PRACTITIONER

## 2025-04-30 PROCEDURE — 1160F RVW MEDS BY RX/DR IN RCRD: CPT | Mod: CPTII,S$GLB,, | Performed by: NURSE PRACTITIONER

## 2025-04-30 PROCEDURE — 3079F DIAST BP 80-89 MM HG: CPT | Mod: CPTII,S$GLB,, | Performed by: NURSE PRACTITIONER

## 2025-04-30 PROCEDURE — 3008F BODY MASS INDEX DOCD: CPT | Mod: CPTII,S$GLB,, | Performed by: NURSE PRACTITIONER

## 2025-04-30 RX ORDER — PEN NEEDLE, DIABETIC 32GX 5/32"
1 NEEDLE, DISPOSABLE MISCELLANEOUS NIGHTLY
Qty: 100 EACH | Refills: 2 | Status: SHIPPED | OUTPATIENT
Start: 2025-04-30

## 2025-04-30 RX ORDER — PEN NEEDLE, DIABETIC 32GX 5/32"
NEEDLE, DISPOSABLE MISCELLANEOUS
COMMUNITY
Start: 2024-12-26 | End: 2025-04-30 | Stop reason: SDUPTHER

## 2025-04-30 RX ORDER — PEN NEEDLE, DIABETIC 29 G X1/2"
NEEDLE, DISPOSABLE MISCELLANEOUS
COMMUNITY
Start: 2024-12-30

## 2025-04-30 RX ORDER — INSULIN DETEMIR 100 [IU]/ML
50 INJECTION, SOLUTION SUBCUTANEOUS NIGHTLY
Qty: 15 ML | Refills: 11 | Status: SHIPPED | OUTPATIENT
Start: 2025-04-30 | End: 2025-05-01 | Stop reason: CLARIF

## 2025-04-30 RX ORDER — BLOOD-GLUCOSE SENSOR
1 EACH MISCELLANEOUS DAILY
Qty: 1 EACH | Refills: 5 | Status: SHIPPED | OUTPATIENT
Start: 2025-04-30 | End: 2026-04-30

## 2025-04-30 RX ORDER — LATANOPROST 50 UG/ML
1 SOLUTION/ DROPS OPHTHALMIC
COMMUNITY
Start: 2024-11-21

## 2025-04-30 RX ORDER — AMOXICILLIN 500 MG/1
500 CAPSULE ORAL 3 TIMES DAILY
COMMUNITY
Start: 2025-04-19 | End: 2025-05-02

## 2025-04-30 NOTE — PROGRESS NOTES
Subjective:       Patient ID: Ivan Ngo is a 54 y.o. male.    Chief Complaint: Diabetes    HPI     Ivan Ngo is a 54 y.o. male patient that presents to clinic with a chief complaint of diabetes. Past medical and surgical history reviewed as listed. PCP is Brunilda Osborn MD , he is  new  to me.     Diabetes  He presents for his follow-up diabetic visit. He has type 2 diabetes mellitus. His disease course has been stable. Hypoglycemia symptoms include sweats. Associated symptoms include blurred vision and polyuria. Symptoms are improving. Risk factors for coronary artery disease include hypertension, male sex, stress and obesity. Current diabetic treatment includes insulin injections (Lantus 60 units; Fiasp sliding scale (fast acting)). He is compliant with treatment all of the time. He is following a generally healthy diet. Meal planning includes avoidance of concentrated sweets. He rarely participates in exercise. Home blood sugar record trend: Fasting glucose in the morning was 40's (has occured occassionally) His breakfast blood glucose is taken between 6-7 am. His breakfast blood glucose range is generally  mg/dl. An ACE inhibitor/angiotensin II receptor blocker is not being taken. He does not see a podiatrist.Eye exam is current.     States he has been taking wife's Lantus and Novolog insulin.  He reports he takes 10-20 units of Novolog with meals if glucose is 180 or greater.     Dexcom 7 average glucose is 179  over the last 90 days.    Bilateral retina surgeries, last surgery was January 2025. Hemorrhage in right eye ; last seen by Ophthalmology 2 weeks ago w/ Dr. Lee.     Wt Readings from Last 3 Encounters:   04/30/25 1531 110.7 kg (244 lb 0.8 oz)   04/19/25 1001 104.3 kg (230 lb)   04/19/25 0632 104.3 kg (230 lb)   09/30/24 0840 106.9 kg (235 lb 10.8 oz)      Past Medical History:   Diagnosis Date    PTSD (post-traumatic stress disorder)     Type 2 diabetes mellitus with unspecified  diabetic retinopathy without macular edema     Vitreous hemorrhage 2025      Past Surgical History:   Procedure Laterality Date    EYE SURGERY Bilateral 2023    Detachedretina    EYE SURGERY  2024      Family History   Problem Relation Name Age of Onset    Breast cancer Mother Alexandrea 42    Early death Mother Alexandrea     Alcohol abuse Father Jose Luis     Cancer Father Jose Luis 55        Lungs, thyroid, brain    Diabetes Father Jose Luis     Early death Father Jose Luis       Review of patient's allergies indicates:   Allergen Reactions    Allergen ext-venom-honey bee      Other Reaction(s): Pharyngeal swelling, Bronchospasm, Pharyngeal swelling, Bronchospasm    Wasp venom      Other Reaction(s): Pharyngeal swelling, Bronchospasm, Pharyngeal swelling, Bronchospasm     Review of Systems   Eyes:  Positive for blurred vision.   Endocrine: Positive for polyuria.       Objective:      Vitals:    04/30/25 1531   BP: 120/80   Pulse: 75   Temp: 98.2 °F (36.8 °C)   TempSrc: Oral   SpO2: 97%   Weight: 110.7 kg (244 lb 0.8 oz)   Height: 6' (1.829 m)      Physical Exam  Vitals and nursing note reviewed.   Constitutional:       General: He is not in acute distress.     Appearance: Normal appearance.   HENT:      Head: Normocephalic and atraumatic.   Eyes:      General: Visual field deficit present.      Conjunctiva/sclera: Conjunctivae normal.      Pupils: Pupils are equal, round, and reactive to light.   Pulmonary:      Effort: Pulmonary effort is normal. No respiratory distress.   Musculoskeletal:      Cervical back: Normal range of motion.   Skin:     General: Skin is warm and dry.   Neurological:      Mental Status: He is alert and oriented to person, place, and time.   Psychiatric:         Mood and Affect: Mood normal.         Behavior: Behavior normal.         Lab Results   Component Value Date    WBC 6.27 04/19/2025    HGB 14.8 04/19/2025    HCT 42.4 04/19/2025     04/19/2025    CHOL 182 07/19/2024    TRIG 211 (H) 07/19/2024    HDL 32 (L)  "07/19/2024    ALT 26 04/19/2025    AST 19 04/19/2025     04/19/2025    K 3.8 04/19/2025     04/19/2025    CREATININE 0.9 04/19/2025    BUN 15 04/19/2025    CO2 20 (L) 04/19/2025    TSH 1.365 07/19/2024    PSA 0.90 07/19/2024    INR 0.9 04/19/2025    HGBA1C 9.3 (H) 07/19/2024      Assessment:       1. Uncontrolled type 2 diabetes mellitus with hyperglycemia    2. Type 2 diabetes mellitus with left eye affected by retinopathy without macular edema, with long-term current use of insulin, unspecified retinopathy severity    3. Encounter for colorectal cancer screening        Plan:       Uncontrolled type 2 diabetes mellitus with hyperglycemia  Discontinue Novolog that was not ordered by provider.   Discussed importance of avoiding taking medications from others.   Will discontinue Lantus as patient states pharmacy said medication is discontinued.   Reduce dose from 60 U to 50 U.   ADA diet.   Recommend OTC glucose gel to keep handy for periods of s/s of hypoglycemia.   Will d/c Mounjaro as pt never started. Notes ophthalmology recommends not using GLP-1 due to retinopathy.   Will do e consult once A1c finalizes.   -     insulin detemir U-100, Levemir, (LEVEMIR FLEXPEN) 100 unit/mL (3 mL) InPn pen; Inject 50 Units into the skin every evening.  Dispense: 15 mL; Refill: 11  -     blood-glucose sensor (DEXCOM G7 SENSOR) Erna; 1 Device by Misc.(Non-Drug; Combo Route) route once daily.  Dispense: 1 each; Refill: 5  -     BD RACHEL 2ND GEN PEN NEEDLE 32 gauge x 5/32" Ndle; Inject 1 Application into the skin every evening.  Dispense: 100 each; Refill: 2  -     Ambulatory referral/consult to Podiatry; Future; Expected date: 04/30/2025    Type 2 diabetes mellitus with left eye affected by retinopathy without macular edema, with long-term current use of insulin, unspecified retinopathy severity  Follow up with Ophthalmology as scheduled.     Encounter for colorectal cancer screening  -     Ambulatory referral/consult to " "Endo Procedure ; Future; Expected date: 2025      Medication List with Changes/Refills   New Medications    INSULIN DETEMIR U-100, LEVEMIR, (LEVEMIR FLEXPEN) 100 UNIT/ML (3 ML) INPN PEN    Inject 50 Units into the skin every evening.   Current Medications    AMOXICILLIN (AMOXIL) 500 MG CAPSULE    Take 500 mg by mouth 3 (three) times daily.    ASCORBIC ACID (VITAMIN C ORAL)    Take by mouth.    ATORVASTATIN (LIPITOR) 40 MG TABLET    Take 1 tablet (40 mg total) by mouth once daily.    BD INSULIN SYRINGE ULTRA-FINE 1 ML 31 GAUGE X 5/16 SYRG    SMARTSI Each Daily    BLOOD-GLUCOSE METER,CONTINUOUS (DEXCOM G6 ) MISC    1 Box by Misc.(Non-Drug; Combo Route) route Daily.    BLOOD-GLUCOSE TRANSMITTER (DEXCOM G6 TRANSMITTER) JOHN PAUL    1 Device by Misc.(Non-Drug; Combo Route) route Daily.    INSULIN SYRINGE-NEEDLE U-100 0.5 ML 31 GAUGE X 5/16" SYRG    1 each by Misc.(Non-Drug; Combo Route) route Daily.    LATANOPROST 0.005 % OPHTHALMIC SOLUTION    1 drop.    PEN NEEDLE, DIABETIC 31 GAUGE X 3/16" NDLE    1 each by Misc.(Non-Drug; Combo Route) route Daily.    TADALAFIL (CIALIS) 10 MG TABLET    Take 1 tablet (10 mg total) by mouth daily as needed for Erectile Dysfunction.    TIRZEPATIDE (MOUNJARO) 5 MG/0.5 ML PNIJ    Inject 5 mg into the skin every 7 days.   Changed and/or Refilled Medications    Modified Medication Previous Medication    BD RACHEL 2ND GEN PEN NEEDLE 32 GAUGE X 5/32" NDLE BD RACHEL 2ND GEN PEN NEEDLE 32 gauge x 5/32" Ndle       Inject 1 Application into the skin every evening.    SMARTSI Each Daily    BLOOD-GLUCOSE SENSOR (DEXCOM G7 SENSOR) JOHN PAUL blood-glucose sensor (DEXCOM G7 SENSOR) John Paul       1 Device by Misc.(Non-Drug; Combo Route) route once daily.    1 Device by Misc.(Non-Drug; Combo Route) route once daily.   Discontinued Medications    BLOOD-GLUCOSE SENSOR (DEXCOM G6 SENSOR) JOHN PAUL    Route: 1 Device by Misc.(Non-Drug; Combo Route) route once daily.    BLOOD-GLUCOSE SENSOR (DEXCOM G6 " SENSOR) JOHN PAUL    Please change every 10 days    INSULIN GLARGINE U-100, LANTUS, (LANTUS U-100 INSULIN) 100 UNIT/ML INJECTION    INJECT 50 UNITS INTO THE SKIN ONCE DAILY.                Mei Tan DNP, APRN, FNP-C  Family Medicine [facility]  05/01/2025 3:54 PM

## 2025-05-01 ENCOUNTER — LAB VISIT (OUTPATIENT)
Dept: LAB | Facility: HOSPITAL | Age: 55
End: 2025-05-01
Attending: INTERNAL MEDICINE
Payer: COMMERCIAL

## 2025-05-01 ENCOUNTER — RESULTS FOLLOW-UP (OUTPATIENT)
Dept: FAMILY MEDICINE | Facility: CLINIC | Age: 55
End: 2025-05-01

## 2025-05-01 DIAGNOSIS — E11.65 UNCONTROLLED TYPE 2 DIABETES MELLITUS WITH HYPERGLYCEMIA: ICD-10-CM

## 2025-05-01 DIAGNOSIS — Z79.4 TYPE 2 DIABETES MELLITUS WITH LEFT EYE AFFECTED BY RETINOPATHY WITHOUT MACULAR EDEMA, WITH LONG-TERM CURRENT USE OF INSULIN, UNSPECIFIED RETINOPATHY SEVERITY: ICD-10-CM

## 2025-05-01 DIAGNOSIS — E11.319 TYPE 2 DIABETES MELLITUS WITH LEFT EYE AFFECTED BY RETINOPATHY WITHOUT MACULAR EDEMA, WITH LONG-TERM CURRENT USE OF INSULIN, UNSPECIFIED RETINOPATHY SEVERITY: ICD-10-CM

## 2025-05-01 LAB
ABSOLUTE EOSINOPHIL (OHS): 0.25 K/UL
ABSOLUTE MONOCYTE (OHS): 0.45 K/UL (ref 0.3–1)
ABSOLUTE NEUTROPHIL COUNT (OHS): 3.64 K/UL (ref 1.8–7.7)
ALBUMIN SERPL BCP-MCNC: 3.8 G/DL (ref 3.5–5.2)
ALP SERPL-CCNC: 91 UNIT/L (ref 40–150)
ALT SERPL W/O P-5'-P-CCNC: 27 UNIT/L (ref 10–44)
ANION GAP (OHS): 9 MMOL/L (ref 8–16)
AST SERPL-CCNC: 20 UNIT/L (ref 11–45)
BASOPHILS # BLD AUTO: 0.06 K/UL
BASOPHILS NFR BLD AUTO: 1 %
BILIRUB SERPL-MCNC: 0.6 MG/DL (ref 0.1–1)
BUN SERPL-MCNC: 14 MG/DL (ref 6–20)
CALCIUM SERPL-MCNC: 9.1 MG/DL (ref 8.7–10.5)
CHLORIDE SERPL-SCNC: 107 MMOL/L (ref 95–110)
CO2 SERPL-SCNC: 27 MMOL/L (ref 23–29)
CREAT SERPL-MCNC: 0.9 MG/DL (ref 0.5–1.4)
EAG (OHS): 137 MG/DL (ref 68–131)
ERYTHROCYTE [DISTWIDTH] IN BLOOD BY AUTOMATED COUNT: 13.1 % (ref 11.5–14.5)
GFR SERPLBLD CREATININE-BSD FMLA CKD-EPI: >60 ML/MIN/1.73/M2
GLUCOSE SERPL-MCNC: 114 MG/DL (ref 70–110)
HBA1C MFR BLD: 6.4 % (ref 4–5.6)
HCT VFR BLD AUTO: 44.9 % (ref 40–54)
HGB BLD-MCNC: 15.4 GM/DL (ref 14–18)
IMM GRANULOCYTES # BLD AUTO: 0.01 K/UL (ref 0–0.04)
IMM GRANULOCYTES NFR BLD AUTO: 0.2 % (ref 0–0.5)
LYMPHOCYTES # BLD AUTO: 1.75 K/UL (ref 1–4.8)
MCH RBC QN AUTO: 30.2 PG (ref 27–31)
MCHC RBC AUTO-ENTMCNC: 34.3 G/DL (ref 32–36)
MCV RBC AUTO: 88 FL (ref 82–98)
NUCLEATED RBC (/100WBC) (OHS): 0 /100 WBC
PLATELET # BLD AUTO: 182 K/UL (ref 150–450)
PMV BLD AUTO: 11.3 FL (ref 9.2–12.9)
POTASSIUM SERPL-SCNC: 3.7 MMOL/L (ref 3.5–5.1)
PROT SERPL-MCNC: 7.4 GM/DL (ref 6–8.4)
RBC # BLD AUTO: 5.1 M/UL (ref 4.6–6.2)
RELATIVE EOSINOPHIL (OHS): 4.1 %
RELATIVE LYMPHOCYTE (OHS): 28.4 % (ref 18–48)
RELATIVE MONOCYTE (OHS): 7.3 % (ref 4–15)
RELATIVE NEUTROPHIL (OHS): 59 % (ref 38–73)
SODIUM SERPL-SCNC: 143 MMOL/L (ref 136–145)
WBC # BLD AUTO: 6.16 K/UL (ref 3.9–12.7)

## 2025-05-01 PROCEDURE — 84520 ASSAY OF UREA NITROGEN: CPT

## 2025-05-01 PROCEDURE — 83036 HEMOGLOBIN GLYCOSYLATED A1C: CPT

## 2025-05-01 PROCEDURE — 85025 COMPLETE CBC W/AUTO DIFF WBC: CPT

## 2025-05-01 PROCEDURE — 36415 COLL VENOUS BLD VENIPUNCTURE: CPT | Mod: PO

## 2025-05-01 RX ORDER — INSULIN DEGLUDEC 100 U/ML
50 INJECTION, SOLUTION SUBCUTANEOUS NIGHTLY
Qty: 15 ML | Refills: 11 | Status: SHIPPED | OUTPATIENT
Start: 2025-05-01 | End: 2025-05-02

## 2025-05-02 ENCOUNTER — OFFICE VISIT (OUTPATIENT)
Dept: FAMILY MEDICINE | Facility: CLINIC | Age: 55
End: 2025-05-02
Payer: COMMERCIAL

## 2025-05-02 VITALS
SYSTOLIC BLOOD PRESSURE: 130 MMHG | DIASTOLIC BLOOD PRESSURE: 88 MMHG | WEIGHT: 242.75 LBS | HEART RATE: 68 BPM | BODY MASS INDEX: 32.88 KG/M2 | TEMPERATURE: 99 F | HEIGHT: 72 IN | OXYGEN SATURATION: 98 %

## 2025-05-02 DIAGNOSIS — R41.840 INATTENTION: ICD-10-CM

## 2025-05-02 DIAGNOSIS — Z79.4 TYPE 2 DIABETES MELLITUS WITH RETINOPATHY WITHOUT MACULAR EDEMA, WITH LONG-TERM CURRENT USE OF INSULIN, UNSPECIFIED LATERALITY, UNSPECIFIED RETINOPATHY SEVERITY: ICD-10-CM

## 2025-05-02 DIAGNOSIS — Z12.11 ENCOUNTER FOR COLORECTAL CANCER SCREENING: ICD-10-CM

## 2025-05-02 DIAGNOSIS — F43.10 PTSD (POST-TRAUMATIC STRESS DISORDER): ICD-10-CM

## 2025-05-02 DIAGNOSIS — Z12.12 ENCOUNTER FOR COLORECTAL CANCER SCREENING: ICD-10-CM

## 2025-05-02 DIAGNOSIS — F51.01 PRIMARY INSOMNIA: Primary | ICD-10-CM

## 2025-05-02 DIAGNOSIS — E11.319 TYPE 2 DIABETES MELLITUS WITH RETINOPATHY WITHOUT MACULAR EDEMA, WITH LONG-TERM CURRENT USE OF INSULIN, UNSPECIFIED LATERALITY, UNSPECIFIED RETINOPATHY SEVERITY: ICD-10-CM

## 2025-05-02 PROCEDURE — 99999 PR PBB SHADOW E&M-EST. PATIENT-LVL V: CPT | Mod: PBBFAC,,, | Performed by: NURSE PRACTITIONER

## 2025-05-02 PROCEDURE — 1160F RVW MEDS BY RX/DR IN RCRD: CPT | Mod: CPTII,S$GLB,, | Performed by: NURSE PRACTITIONER

## 2025-05-02 PROCEDURE — 1159F MED LIST DOCD IN RCRD: CPT | Mod: CPTII,S$GLB,, | Performed by: NURSE PRACTITIONER

## 2025-05-02 PROCEDURE — 3075F SYST BP GE 130 - 139MM HG: CPT | Mod: CPTII,S$GLB,, | Performed by: NURSE PRACTITIONER

## 2025-05-02 PROCEDURE — 3079F DIAST BP 80-89 MM HG: CPT | Mod: CPTII,S$GLB,, | Performed by: NURSE PRACTITIONER

## 2025-05-02 PROCEDURE — 99214 OFFICE O/P EST MOD 30 MIN: CPT | Mod: S$GLB,,, | Performed by: NURSE PRACTITIONER

## 2025-05-02 PROCEDURE — 3008F BODY MASS INDEX DOCD: CPT | Mod: CPTII,S$GLB,, | Performed by: NURSE PRACTITIONER

## 2025-05-02 PROCEDURE — 3044F HG A1C LEVEL LT 7.0%: CPT | Mod: CPTII,S$GLB,, | Performed by: NURSE PRACTITIONER

## 2025-05-02 RX ORDER — INSULIN DEGLUDEC 100 U/ML
50 INJECTION, SOLUTION SUBCUTANEOUS NIGHTLY
Qty: 45 ML | Refills: 3 | Status: SHIPPED | OUTPATIENT
Start: 2025-05-02 | End: 2026-05-02

## 2025-05-02 RX ORDER — INSULIN DEGLUDEC 100 U/ML
50 INJECTION, SOLUTION SUBCUTANEOUS NIGHTLY
Qty: 45 ML | Refills: 3 | Status: SHIPPED | OUTPATIENT
Start: 2025-05-02 | End: 2025-05-02 | Stop reason: SDUPTHER

## 2025-05-02 RX ORDER — TRAZODONE HYDROCHLORIDE 50 MG/1
50 TABLET ORAL NIGHTLY
Qty: 30 TABLET | Refills: 0 | Status: SHIPPED | OUTPATIENT
Start: 2025-05-02 | End: 2025-06-01

## 2025-05-02 NOTE — ADDENDUM NOTE
Addended by: NETTA SALES on: 5/2/2025 04:58 PM     Modules accepted: Orders     No Vaccines Administered.

## 2025-05-02 NOTE — PROGRESS NOTES
"Subjective:       Patient ID: Ivan Ngo is a 54 y.o. male.    Chief Complaint: Follow-up    HPI     vIan Ngo is a 54 y.o. male patient that presents to clinic with a chief complaint of insomnia. Past medical and surgical history reviewed as listed. PCP is Brunilda Osborn MD , she is known to me.     Insomnia- falls asleep easily but only rests for 1-2 hours per night. Reports night terrors which his wife has witnessed.   Denies thoughts of harming others. Denies thoughts about wanting to harm self. He does have occasional thoughts of wondering how it would be if he was not here. States he sometimes have those feelings for a "while".     Will hold endocrinology referral for now since glucose is well controlled.     Past Medical History:   Diagnosis Date    PTSD (post-traumatic stress disorder)     Type 2 diabetes mellitus with unspecified diabetic retinopathy without macular edema     Vitreous hemorrhage 2025      Past Surgical History:   Procedure Laterality Date    EYE SURGERY Bilateral 2023    Detachedretina    EYE SURGERY  2024      Family History   Problem Relation Name Age of Onset    Breast cancer Mother Alexandrea 42    Early death Mother Alexandrea     Alcohol abuse Father Jose Luis     Cancer Father Jose Luis 55        Lungs, thyroid, brain    Diabetes Father Jose Luis     Early death Father Jose Luis       Review of patient's allergies indicates:   Allergen Reactions    Allergen ext-venom-honey bee      Other Reaction(s): Pharyngeal swelling, Bronchospasm, Pharyngeal swelling, Bronchospasm    Wasp venom      Other Reaction(s): Pharyngeal swelling, Bronchospasm, Pharyngeal swelling, Bronchospasm     Review of Systems   Psychiatric/Behavioral:  Positive for sleep disturbance.        Objective:      Vitals:    05/02/25 1426   BP: 130/88   Pulse: 68   Temp: 98.5 °F (36.9 °C)   TempSrc: Oral   SpO2: 98%   Weight: 110.1 kg (242 lb 11.6 oz)   Height: 6' (1.829 m)      Physical Exam  Vitals and nursing note reviewed.   Constitutional:  "      General: He is not in acute distress.     Appearance: Normal appearance.   HENT:      Head: Normocephalic and atraumatic.   Eyes:      Conjunctiva/sclera: Conjunctivae normal.      Pupils: Pupils are equal, round, and reactive to light.   Pulmonary:      Effort: Pulmonary effort is normal. No respiratory distress.   Musculoskeletal:      Cervical back: Normal range of motion.   Skin:     General: Skin is warm and dry.   Neurological:      Mental Status: He is alert and oriented to person, place, and time.   Psychiatric:         Mood and Affect: Mood normal.         Behavior: Behavior normal.         Lab Results   Component Value Date    WBC 6.16 05/01/2025    HGB 15.4 05/01/2025    HCT 44.9 05/01/2025     05/01/2025    CHOL 182 07/19/2024    TRIG 211 (H) 07/19/2024    HDL 32 (L) 07/19/2024    ALT 27 05/01/2025    AST 20 05/01/2025     05/01/2025    K 3.7 05/01/2025     05/01/2025    CREATININE 0.9 05/01/2025    BUN 14 05/01/2025    CO2 27 05/01/2025    TSH 1.365 07/19/2024    PSA 0.90 07/19/2024    INR 0.9 04/19/2025    HGBA1C 6.4 (H) 05/01/2025      Assessment:       1. Primary insomnia    2. Type 2 diabetes mellitus with retinopathy without macular edema, with long-term current use of insulin, unspecified laterality, unspecified retinopathy severity    3. PTSD (post-traumatic stress disorder)    4. Inattention    5. Encounter for colorectal cancer screening        Plan:       Primary insomnia  -     traZODone (DESYREL) 50 MG tablet; Take 1 tablet (50 mg total) by mouth every evening.  Dispense: 30 tablet; Refill: 0    Type 2 diabetes mellitus with retinopathy without macular edema, with long-term current use of insulin, unspecified laterality, unspecified retinopathy severity  Continue Tresiba.   Monitor diet and exercise only as tolerated when cleared by Ophthalmology.   Repeat fasting glucose in 1 month.   -     Basic Metabolic Panel; Future; Expected date: 05/02/2025    PTSD  "(post-traumatic stress disorder)  Provided patient with phone number to psychiatry referral.   -     Ambulatory referral/consult to Psychiatry; Future; Expected date: 2025    Inattention  -     Ambulatory referral/consult to Psychiatry; Future; Expected date: 2025    Encounter for colorectal cancer screening  -     Cologuard Screening (Multitarget Stool DNA); Future; Expected date: 2025      Medication List with Changes/Refills   New Medications    TRAZODONE (DESYREL) 50 MG TABLET    Take 1 tablet (50 mg total) by mouth every evening.   Current Medications    ASCORBIC ACID (VITAMIN C ORAL)    Take by mouth.    ATORVASTATIN (LIPITOR) 40 MG TABLET    Take 1 tablet (40 mg total) by mouth once daily.    BD INSULIN SYRINGE ULTRA-FINE 1 ML 31 GAUGE X 5/16 SYRG    SMARTSI Each Daily    BD RACHEL 2ND GEN PEN NEEDLE 32 GAUGE X 5/32" NDLE    Inject 1 Application into the skin every evening.    BLOOD-GLUCOSE METER,CONTINUOUS (DEXCOM G6 ) MISC    1 Box by Misc.(Non-Drug; Combo Route) route Daily.    BLOOD-GLUCOSE SENSOR (DEXCOM G7 SENSOR) JOHN PAUL    1 Device by Misc.(Non-Drug; Combo Route) route once daily.    BLOOD-GLUCOSE TRANSMITTER (DEXCOM G6 TRANSMITTER) JOHN PAUL    1 Device by Misc.(Non-Drug; Combo Route) route Daily.    INSULIN SYRINGE-NEEDLE U-100 0.5 ML 31 GAUGE X 5/16" SYRG    1 each by Misc.(Non-Drug; Combo Route) route Daily.    LATANOPROST 0.005 % OPHTHALMIC SOLUTION    1 drop.    PEN NEEDLE, DIABETIC 31 GAUGE X 3/16" NDLE    1 each by Misc.(Non-Drug; Combo Route) route Daily.    TADALAFIL (CIALIS) 10 MG TABLET    Take 1 tablet (10 mg total) by mouth daily as needed for Erectile Dysfunction.   Changed and/or Refilled Medications    Modified Medication Previous Medication    INSULIN DEGLUDEC (TRESIBA FLEXTOUCH U-100) 100 UNIT/ML (3 ML) INSULIN PEN insulin degludec (TRESIBA FLEXTOUCH U-100) 100 unit/mL (3 mL) insulin pen       Inject 50 Units into the skin every evening.    INJECT 50 UNITS INTO " THE SKIN EVERY EVENING.   Discontinued Medications    AMOXICILLIN (AMOXIL) 500 MG CAPSULE    Take 500 mg by mouth 3 (three) times daily.                Mei Tan, CAMI, APRN, FNP-C  Family Medicine  [facility]  05/05/2025 2:49 PM

## 2025-05-03 ENCOUNTER — TELEPHONE (OUTPATIENT)
Dept: ENDOSCOPY | Facility: HOSPITAL | Age: 55
End: 2025-05-03

## 2025-05-03 ENCOUNTER — CLINICAL SUPPORT (OUTPATIENT)
Dept: ENDOSCOPY | Facility: HOSPITAL | Age: 55
End: 2025-05-03
Payer: COMMERCIAL

## 2025-05-03 DIAGNOSIS — Z12.11 ENCOUNTER FOR COLORECTAL CANCER SCREENING: ICD-10-CM

## 2025-05-03 DIAGNOSIS — Z12.12 ENCOUNTER FOR COLORECTAL CANCER SCREENING: ICD-10-CM

## 2025-05-03 NOTE — PLAN OF CARE
Contacted the patient to schedule an endoscopy procedure(s) Colonoscopy . Patient states his PCP order him a cologuard kit.

## 2025-05-15 ENCOUNTER — TELEPHONE (OUTPATIENT)
Dept: PODIATRY | Facility: CLINIC | Age: 55
End: 2025-05-15
Payer: COMMERCIAL

## 2025-05-15 NOTE — TELEPHONE ENCOUNTER
Called the pt twice no answer left a detailed vm with new appt and also will mail out appt letter.    Beronica ARZATE

## 2025-06-02 DIAGNOSIS — F51.01 PRIMARY INSOMNIA: ICD-10-CM

## 2025-06-03 RX ORDER — TRAZODONE HYDROCHLORIDE 50 MG/1
50 TABLET ORAL NIGHTLY
Qty: 30 TABLET | Refills: 0 | Status: SHIPPED | OUTPATIENT
Start: 2025-06-03

## 2025-07-05 DIAGNOSIS — E11.65 UNCONTROLLED TYPE 2 DIABETES MELLITUS WITH HYPERGLYCEMIA: ICD-10-CM

## 2025-07-06 DIAGNOSIS — F51.01 PRIMARY INSOMNIA: ICD-10-CM

## 2025-07-07 RX ORDER — BLOOD-GLUCOSE SENSOR
1 EACH MISCELLANEOUS DAILY
Qty: 3 EACH | Refills: 1 | Status: SHIPPED | OUTPATIENT
Start: 2025-07-07 | End: 2026-07-07

## 2025-07-07 RX ORDER — TRAZODONE HYDROCHLORIDE 50 MG/1
50 TABLET ORAL NIGHTLY
Qty: 30 TABLET | Refills: 0 | Status: SHIPPED | OUTPATIENT
Start: 2025-07-07

## 2025-07-07 NOTE — TELEPHONE ENCOUNTER
"Last Office Visit Info:   The patient's last visit with Brunilda Osborn MD was on 2024.    The patient's last visit in current department was on 2025.        Last CBC Results:   Lab Results   Component Value Date    WBC 6.16 2025    HGB 15.4 2025    HCT 44.9 2025     2025       Last CMP Results  Lab Results   Component Value Date     2025    K 3.7 2025     2025    CO2 27 2025    BUN 14 2025    CREATININE 0.9 2025    CALCIUM 9.1 2025    ALBUMIN 3.8 2025    AST 20 2025    ALT 27 2025       Last Lipids  Lab Results   Component Value Date    CHOL 182 2024    TRIG 211 (H) 2024    HDL 32 (L) 2024    LDLCALC 107.8 2024       Last A1C  Lab Results   Component Value Date    HGBA1C 6.4 (H) 2025       Last TSH  Lab Results   Component Value Date    TSH 1.365 2024             Current Med Refills  Medication List with Changes/Refills   Current Medications    ASCORBIC ACID (VITAMIN C ORAL)    Take by mouth.       Start Date: --        End Date: --    ATORVASTATIN (LIPITOR) 40 MG TABLET    Take 1 tablet (40 mg total) by mouth once daily.       Start Date: 2024 End Date: 2025    BD INSULIN SYRINGE ULTRA-FINE 1 ML 31 GAUGE X 5/16 SYRG    SMARTSI Each Daily       Start Date: 2024End Date: --    BD RACHEL 2ND GEN PEN NEEDLE 32 GAUGE X 5/32" NDLE    Inject 1 Application into the skin every evening.       Start Date: 2025 End Date: --    BLOOD-GLUCOSE METER,CONTINUOUS (DEXCOM G6 ) MISC    1 Box by Misc.(Non-Drug; Combo Route) route Daily.       Start Date: 2024 End Date: 2025    BLOOD-GLUCOSE SENSOR (DEXCOM G7 SENSOR) JOHN PAUL    1 Device by Misc.(Non-Drug; Combo Route) route once daily.       Start Date: 2025 End Date: 2026    BLOOD-GLUCOSE TRANSMITTER (DEXCOM G6 TRANSMITTER) JOHN PAUL    1 Device by Misc.(Non-Drug; Combo Route) route Daily.       " "Start Date: 9/30/2024 End Date: 9/30/2025    INSULIN DEGLUDEC (TRESIBA FLEXTOUCH U-100) 100 UNIT/ML (3 ML) INSULIN PEN    Inject 50 Units into the skin every evening.       Start Date: 5/2/2025  End Date: 5/2/2026    INSULIN SYRINGE-NEEDLE U-100 0.5 ML 31 GAUGE X 5/16" SYRG    1 each by Misc.(Non-Drug; Combo Route) route Daily.       Start Date: 12/30/2024End Date: --    LATANOPROST 0.005 % OPHTHALMIC SOLUTION    1 drop.       Start Date: 11/21/2024End Date: --    PEN NEEDLE, DIABETIC 31 GAUGE X 3/16" NDLE    1 each by Misc.(Non-Drug; Combo Route) route Daily.       Start Date: 12/26/2024End Date: --    TADALAFIL (CIALIS) 10 MG TABLET    Take 1 tablet (10 mg total) by mouth daily as needed for Erectile Dysfunction.       Start Date: 8/1/2024  End Date: 8/1/2025   Changed and/or Refilled Medications    Modified Medication Previous Medication    TRAZODONE (DESYREL) 50 MG TABLET traZODone (DESYREL) 50 MG tablet       TAKE 1 TABLET BY MOUTH EVERY EVENING.    TAKE 1 TABLET BY MOUTH EVERY EVENING.       Start Date: 7/7/2025  End Date: --    Start Date: 6/3/2025  End Date: 7/7/2025       Order(s) placed per written order guidelines: none    Please advise.              "

## 2025-07-30 DIAGNOSIS — E11.9 TYPE 2 DIABETES MELLITUS WITHOUT COMPLICATION: ICD-10-CM

## 2025-08-04 DIAGNOSIS — F51.01 PRIMARY INSOMNIA: ICD-10-CM

## 2025-08-05 RX ORDER — TRAZODONE HYDROCHLORIDE 50 MG/1
50 TABLET ORAL NIGHTLY
Qty: 30 TABLET | Refills: 0 | Status: SHIPPED | OUTPATIENT
Start: 2025-08-05

## 2025-08-15 ENCOUNTER — PATIENT OUTREACH (OUTPATIENT)
Dept: ADMINISTRATIVE | Facility: HOSPITAL | Age: 55
End: 2025-08-15
Payer: COMMERCIAL

## 2025-08-15 DIAGNOSIS — E11.69 HYPERLIPIDEMIA ASSOCIATED WITH TYPE 2 DIABETES MELLITUS: ICD-10-CM

## 2025-08-15 DIAGNOSIS — E78.5 HYPERLIPIDEMIA ASSOCIATED WITH TYPE 2 DIABETES MELLITUS: ICD-10-CM

## 2025-08-15 RX ORDER — ATORVASTATIN CALCIUM 40 MG/1
40 TABLET, FILM COATED ORAL DAILY
Qty: 90 TABLET | Refills: 3 | Status: SHIPPED | OUTPATIENT
Start: 2025-08-15 | End: 2026-08-15

## 2025-08-31 DIAGNOSIS — E11.65 UNCONTROLLED TYPE 2 DIABETES MELLITUS WITH HYPERGLYCEMIA: ICD-10-CM

## 2025-09-02 RX ORDER — BLOOD-GLUCOSE SENSOR
EACH MISCELLANEOUS
Qty: 3 EACH | Refills: 1 | Status: SHIPPED | OUTPATIENT
Start: 2025-09-02